# Patient Record
Sex: FEMALE | Race: WHITE | Employment: OTHER | URBAN - NONMETROPOLITAN AREA
[De-identification: names, ages, dates, MRNs, and addresses within clinical notes are randomized per-mention and may not be internally consistent; named-entity substitution may affect disease eponyms.]

---

## 2021-11-08 ENCOUNTER — ANESTHESIA EVENT (OUTPATIENT)
Dept: OPERATING ROOM | Age: 66
End: 2021-11-08
Payer: MEDICARE

## 2021-11-08 ENCOUNTER — HOSPITAL ENCOUNTER (OUTPATIENT)
Age: 66
Discharge: HOME | End: 2021-11-08
Payer: MEDICARE

## 2021-11-08 ENCOUNTER — OFFICE VISIT (OUTPATIENT)
Dept: UROLOGY | Age: 66
End: 2021-11-08
Payer: MEDICARE

## 2021-11-08 VITALS
HEIGHT: 61 IN | DIASTOLIC BLOOD PRESSURE: 81 MMHG | SYSTOLIC BLOOD PRESSURE: 121 MMHG | BODY MASS INDEX: 34.93 KG/M2 | HEART RATE: 72 BPM | WEIGHT: 185 LBS

## 2021-11-08 DIAGNOSIS — N20.1 URETERAL CALCULUS: Primary | ICD-10-CM

## 2021-11-08 DIAGNOSIS — N20.1 URETERAL CALCULUS: ICD-10-CM

## 2021-11-08 PROCEDURE — G8484 FLU IMMUNIZE NO ADMIN: HCPCS | Performed by: UROLOGY

## 2021-11-08 PROCEDURE — 3017F COLORECTAL CA SCREEN DOC REV: CPT | Performed by: UROLOGY

## 2021-11-08 PROCEDURE — 1123F ACP DISCUSS/DSCN MKR DOCD: CPT | Performed by: UROLOGY

## 2021-11-08 PROCEDURE — G8400 PT W/DXA NO RESULTS DOC: HCPCS | Performed by: UROLOGY

## 2021-11-08 PROCEDURE — 99211 OFF/OP EST MAY X REQ PHY/QHP: CPT

## 2021-11-08 PROCEDURE — G8417 CALC BMI ABV UP PARAM F/U: HCPCS | Performed by: UROLOGY

## 2021-11-08 PROCEDURE — 4040F PNEUMOC VAC/ADMIN/RCVD: CPT | Performed by: UROLOGY

## 2021-11-08 PROCEDURE — 1090F PRES/ABSN URINE INCON ASSESS: CPT | Performed by: UROLOGY

## 2021-11-08 PROCEDURE — 99204 OFFICE O/P NEW MOD 45 MIN: CPT | Performed by: UROLOGY

## 2021-11-08 PROCEDURE — G8427 DOCREV CUR MEDS BY ELIG CLIN: HCPCS | Performed by: UROLOGY

## 2021-11-08 PROCEDURE — 1036F TOBACCO NON-USER: CPT | Performed by: UROLOGY

## 2021-11-08 RX ORDER — PANTOPRAZOLE SODIUM 20 MG/1
20 TABLET, DELAYED RELEASE ORAL DAILY
COMMUNITY

## 2021-11-08 RX ORDER — HYDROCODONE BITARTRATE AND ACETAMINOPHEN 5; 325 MG/1; MG/1
TABLET ORAL
COMMUNITY
Start: 2021-11-04 | End: 2021-11-15

## 2021-11-08 RX ORDER — ONDANSETRON 4 MG/1
TABLET, ORALLY DISINTEGRATING ORAL
COMMUNITY
Start: 2021-11-04

## 2021-11-08 RX ORDER — SERTRALINE HYDROCHLORIDE 100 MG/1
TABLET, FILM COATED ORAL
COMMUNITY
Start: 2021-08-23

## 2021-11-08 RX ORDER — AMLODIPINE AND VALSARTAN 5; 160 MG/1; MG/1
TABLET ORAL
COMMUNITY
Start: 2021-08-23

## 2021-11-08 RX ORDER — CETIRIZINE HYDROCHLORIDE 10 MG/1
10 TABLET ORAL DAILY
COMMUNITY

## 2021-11-08 RX ORDER — KETOROLAC TROMETHAMINE 10 MG/1
TABLET, FILM COATED ORAL
COMMUNITY
Start: 2021-11-04

## 2021-11-08 RX ORDER — TAMSULOSIN HYDROCHLORIDE 0.4 MG/1
CAPSULE ORAL
COMMUNITY
Start: 2021-11-04

## 2021-11-08 ASSESSMENT — ENCOUNTER SYMPTOMS
NAUSEA: 0
VOMITING: 0
SHORTNESS OF BREATH: 0
COLOR CHANGE: 0
EYE REDNESS: 0
WHEEZING: 0
BACK PAIN: 0
ABDOMINAL PAIN: 0
COUGH: 0
EYE PAIN: 0

## 2021-11-08 NOTE — H&P
History and Physical    Patient:  Divina Jensen  MRN: [id number]    CHIEF COMPLAINT:  Right flank pain    HISTORY OF PRESENT ILLNESS:   The patient is a 74 y.o. female who presents with right flank pain. Patient being seen here today as a new patient. Patient was referred by outside emergency department. Patient did recently presented with right-sided flank pain. She did have a recent CT scan. This film was independently reviewed today. This does show a 7 mm stone in the proximal ureter on the right. Associated with hydroureteronephrosis. Patient does state that she has never had a kidney stone before. She does state that she was told that she had stones in her bladder at one point time on the CT scan. Patient's pain is still requiring oral pain medication. Patient has no gross hematuria dysuria currently. Patient did have urinalysis drawn the outside hospital that was negative. Pain is moderate today. This is in the right flank radiating to the groin. Patient has never seen urology in the past.    Past Medical History:    Past Medical History:   Diagnosis Date    Cerebral artery occlusion     rt. middle artery -- per patient    Cerebral artery occlusion with cerebral infarction ([facility] [address]     several mini strokes; last one 2015    GERD (gastroesophageal reflux disease)     Hypertension     PONV (postoperative nausea and vomiting)        Past Surgical History:    Past Surgical History:   Procedure Laterality Date    APPENDECTOMY      BREAST BIOPSY Right     CARPAL TUNNEL RELEASE Bilateral     COLONOSCOPY  2019    FINGER TRIGGER RELEASE Right     index finger    TOE SURGERY Left     4th toe    TONSILLECTOMY      TUBAL LIGATION         Medications Prior to Admission:    Prior to Admission medications    Medication Sig Start Date End Date Taking?  Authorizing Provider   HYDROcodone-acetaminophen (NORCO) 5-325 MG per tablet take 1 to 2 tablets by mouth six times a day if needed 11/4/21   Historical Provider, MD   ketorolac (TORADOL) 10 MG tablet take 1 tablet by mouth three times a day if needed for pain  Patient not taking: Reported on 11/8/2021 11/4/21   Historical Provider, MD   ondansetron (ZOFRAN-ODT) 4 MG disintegrating tablet dissolve 1 tablet ON TONGUE every 6 hours if needed for nausea and vomiting 11/4/21   Historical Provider, MD   tamsulosin (FLOMAX) 0.4 MG capsule take 1 capsule by mouth once daily  Patient not taking: Reported on 11/8/2021 11/4/21   Historical Provider, MD   sertraline (ZOLOFT) 100 MG tablet  8/23/21   Historical Provider, MD   amLODIPine-valsartan (Cynthia Green) 5-160 MG per tablet  8/23/21   Historical Provider, MD   cetirizine (ZYRTEC) 10 MG tablet Take 10 mg by mouth daily    Historical Provider, MD   pantoprazole (PROTONIX) 20 MG tablet Take 20 mg by mouth daily    Historical Provider, MD   Lactobacillus-Inulin (CULTURELLE DIGESTIVE DAILY PO) Take by mouth    Historical Provider, MD   sertraline (ZOLOFT) 50 MG tablet Take 50 mg by mouth daily    Historical Provider, MD       Allergies:  Macrodantin [nitrofurantoin] and Sulfa antibiotics    Social History:    Social History     Socioeconomic History    Marital status:      Spouse name: Not on file    Number of children: Not on file    Years of education: Not on file    Highest education level: Not on file   Occupational History    Not on file   Tobacco Use    Smoking status: Former Smoker     Quit date: 7/12/2018     Years since quitting: 3.3    Smokeless tobacco: Never Used   Substance and Sexual Activity    Alcohol use: Not on file    Drug use: Never    Sexual activity: Not on file   Other Topics Concern    Not on file   Social History Narrative    Not on file     Social Determinants of Health     Financial Resource Strain:     Difficulty of Paying Living Expenses: Not on file   Food Insecurity:     Worried About [address] in the Last Year: Not on file    [address] in the Last Year: Not on file   Transportation Needs:     Lack of Transportation (Medical): Not on file    Lack of Transportation (Non-Medical): Not on file   Physical Activity:     Days of Exercise per Week: Not on file    Minutes of Exercise per Session: Not on file   Stress:     Feeling of Stress : Not on file   Social Connections:     Frequency of Communication with Friends and Family: Not on file    Frequency of Social Gatherings with Friends and Family: Not on file    Attends Shinto Services: Not on file    Active Member of [address] [place] [address] [employer] or Organizations: Not on file    Attends Club or Organization Meetings: Not on file    Marital Status: Not on file   Intimate Partner Violence:     Fear of Current or Ex-Partner: Not on file    Emotionally Abused: Not on file    Physically Abused: Not on file    Sexually Abused: Not on file   Housing Stability:     Unable to Pay for Housing in the Last Year: Not on file    Number of [place] in the Last Year: Not on file    Unstable Housing in the Last Year: Not on file       Family History:  No family history on file. REVIEW OF SYSTEMS:  All systems reviewed and negative except for that already noted in the HPI. Physical Exam:      This a 74 y.o. female   Patient Vitals for the past 24 hrs:   Height Weight   11/08/21 1422 5' 1\" (1.549 m) 185 lb (83.9 kg)     Constitutional: Patient in no acute distress. Neuro: Alert and oriented to person, place and time. Psych: mood and affect normal  HEENT negative  Lungs: Respiratory effort is normal  Cardiovascular: Normal peripheral pulses  Abdomen: Soft, non-tender, non-distended with no CVA, flank pain or hepatosplenomegaly. No hernias. Kidneys normal.  Lymphatics: No palpable lymphadenopathy. Bladder non-tender and not distended.   Pelvic exam:  External genitalia normal  Urethral and urethral meatus normal  Vagina normal with no evidence of pelvic prolapse  Uterus normal  Adnexa normal  Anus and perineum normal  Rectal exam not indicated    LABS:   No results for input(s): WBC, HGB, HCT, MCV, PLT in the last 72 hours. No results for input(s): NA, K, CL, CO2, PHOS, BUN, CREATININE in the last 72 hours. Invalid input(s): CA    Additional Lab/culture results:    Urinalysis: No results for input(s): COLORU, PHUR, LABCAST, WBCUA, RBCUA, MUCUS, TRICHOMONAS, YEAST, BACTERIA, CLARITYU, SPECGRAV, LEUKOCYTESUR, UROBILINOGEN, Darnella Montane in the last 72 hours.     Invalid input(s): NITRATE, GLUCOSEUKETONESUAMORPHOUS     -----------------------------------------------------------------  Imaging Results:      Assessment and Plan   Impression:    Patient Active Problem List   Diagnosis    Ureteral calculus       Plan: Right Pravin Galeano MD  5:11 PM 11/8/2021

## 2021-11-08 NOTE — PROGRESS NOTES
HPI:        Patient is a 74 y.o. female in no acute distress. She is alert and oriented to person, place, and time. Patient being seen here today as a new patient. Patient was referred by outside emergency department. Patient did recently presented with right-sided flank pain. She did have a recent CT scan. This film was independently reviewed today. This does show a 7 mm stone in the proximal ureter on the right. Associated with hydroureteronephrosis. Patient does state that she has never had a kidney stone before. She does state that she was told that she had stones in her bladder at one point time on the CT scan. Patient's pain is still requiring oral pain medication. Patient has no gross hematuria dysuria currently. Patient did have urinalysis drawn the outside hospital that was negative. Pain is moderate today. This is in the right flank radiating to the groin. Patient has never seen urology in the past.    No past medical history on file. No past surgical history on file.   Outpatient Encounter Medications as of 11/8/2021   Medication Sig Dispense Refill    HYDROcodone-acetaminophen (NORCO) 5-325 MG per tablet take 1 to 2 tablets by mouth six times a day if needed      ondansetron (ZOFRAN-ODT) 4 MG disintegrating tablet dissolve 1 tablet ON TONGUE every 6 hours if needed for nausea and vomiting      sertraline (ZOLOFT) 100 MG tablet       amLODIPine-valsartan (EXFORGE) 5-160 MG per tablet       cetirizine (ZYRTEC) 10 MG tablet Take 10 mg by mouth daily      pantoprazole (PROTONIX) 20 MG tablet Take 20 mg by mouth daily      Lactobacillus-Inulin (CULTURELLE DIGESTIVE DAILY PO) Take by mouth      sertraline (ZOLOFT) 50 MG tablet Take 50 mg by mouth daily      ketorolac (TORADOL) 10 MG tablet take 1 tablet by mouth three times a day if needed for pain (Patient not taking: Reported on 11/8/2021)      tamsulosin (FLOMAX) 0.4 MG capsule take 1 capsule by mouth once daily (Patient discharge. Musculoskeletal: Negative for back pain, joint swelling and myalgias. Skin: Negative for color change, rash and wound. Neurological: Negative for dizziness, tremors and numbness. Hematological: Negative for adenopathy. Does not bruise/bleed easily. BP (!) 140/87 (Site: Left Upper Arm, Position: Sitting, Cuff Size: Medium Adult)   Pulse 71   Ht 5' 1\" (1.549 m)   Wt 185 lb (83.9 kg)   BMI 34.96 kg/m²       PHYSICAL EXAM:  Constitutional: Patient resting comfortably, in no acute distress. Neuro: Alert and oriented to person place and time. Cranial nerves grossly intact. Psych: Mood and affect normal.  Skin: Warm, dry  HEENT: normocephalic, atraumatic  Lymphatics: No palpable lymphadenopathy  Lungs: Respiratory effort normal, unlabored  Cardiovascular:  Normal peripheral pulses  Abdomen: Soft, non-tender, non-distended with no organomegaly or palpable masses. : No CVA tenderness. Bladder non-tender and not distended. Pelvic: deferred    No results found for: BUN  No results found for: CREATININE    ASSESSMENT:  This is a 74 y.o. female with the following diagnoses:   Diagnosis Orders   1. Ureteral calculus  EKG 12 Lead         PLAN:  We will plan for right-sided holmium laser lithotripsy tomorrow. We will need her to get an EKG today. She does understand the risks and benefits of the procedure. She would like to proceed. We did offer her a trial of medical expulsive therapy. This point time she wishes to go ahead with definitive therapy.

## 2021-11-09 ENCOUNTER — APPOINTMENT (OUTPATIENT)
Dept: GENERAL RADIOLOGY | Age: 66
End: 2021-11-09
Attending: UROLOGY
Payer: MEDICARE

## 2021-11-09 ENCOUNTER — ANESTHESIA (OUTPATIENT)
Dept: OPERATING ROOM | Age: 66
End: 2021-11-09
Payer: MEDICARE

## 2021-11-09 ENCOUNTER — HOSPITAL ENCOUNTER (OUTPATIENT)
Age: 66
Setting detail: OUTPATIENT SURGERY
Discharge: HOME | End: 2021-11-09
Attending: UROLOGY | Admitting: UROLOGY
Payer: MEDICARE

## 2021-11-09 VITALS
RESPIRATION RATE: 16 BRPM | TEMPERATURE: 97.1 F | HEIGHT: 61 IN | BODY MASS INDEX: 34.44 KG/M2 | DIASTOLIC BLOOD PRESSURE: 90 MMHG | SYSTOLIC BLOOD PRESSURE: 149 MMHG | OXYGEN SATURATION: 92 % | HEART RATE: 70 BPM | WEIGHT: 182.4 LBS

## 2021-11-09 VITALS — DIASTOLIC BLOOD PRESSURE: 107 MMHG | SYSTOLIC BLOOD PRESSURE: 188 MMHG | OXYGEN SATURATION: 91 %

## 2021-11-09 LAB
EKG ATRIAL RATE: 71 BPM
EKG P AXIS: 61 DEGREES
EKG P-R INTERVAL: 150 MS
EKG Q-T INTERVAL: 356 MS
EKG QRS DURATION: 82 MS
EKG QTC CALCULATION (BAZETT): 386 MS
EKG R AXIS: 22 DEGREES
EKG T AXIS: 29 DEGREES
EKG VENTRICULAR RATE: 71 BPM
SARS-COV-2, RAPID: NOT DETECTED
SPECIMEN DESCRIPTION: NORMAL

## 2021-11-09 PROCEDURE — C2617 STENT, NON-COR, TEM W/O DEL: HCPCS | Performed by: UROLOGY

## 2021-11-09 PROCEDURE — 2500000003 HC RX 250 WO HCPCS: Performed by: NURSE ANESTHETIST, CERTIFIED REGISTERED

## 2021-11-09 PROCEDURE — 3700000000 HC ANESTHESIA ATTENDED CARE: Performed by: UROLOGY

## 2021-11-09 PROCEDURE — 6360000002 HC RX W HCPCS: Performed by: NURSE ANESTHETIST, CERTIFIED REGISTERED

## 2021-11-09 PROCEDURE — 3700000001 HC ADD 15 MINUTES (ANESTHESIA): Performed by: UROLOGY

## 2021-11-09 PROCEDURE — 7100000010 HC PHASE II RECOVERY - FIRST 15 MIN: Performed by: UROLOGY

## 2021-11-09 PROCEDURE — 2580000003 HC RX 258: Performed by: UROLOGY

## 2021-11-09 PROCEDURE — 2580000003 HC RX 258: Performed by: NURSE ANESTHETIST, CERTIFIED REGISTERED

## 2021-11-09 PROCEDURE — C9803 HOPD COVID-19 SPEC COLLECT: HCPCS

## 2021-11-09 PROCEDURE — 3209999900 FLUORO FOR SURGICAL PROCEDURES

## 2021-11-09 PROCEDURE — C1758 CATHETER, URETERAL: HCPCS | Performed by: UROLOGY

## 2021-11-09 PROCEDURE — 2720000010 HC SURG SUPPLY STERILE: Performed by: UROLOGY

## 2021-11-09 PROCEDURE — 3600000014 HC SURGERY LEVEL 4 ADDTL 15MIN: Performed by: UROLOGY

## 2021-11-09 PROCEDURE — 2709999900 HC NON-CHARGEABLE SUPPLY: Performed by: UROLOGY

## 2021-11-09 PROCEDURE — 6370000000 HC RX 637 (ALT 250 FOR IP): Performed by: NURSE ANESTHETIST, CERTIFIED REGISTERED

## 2021-11-09 PROCEDURE — 6360000002 HC RX W HCPCS: Performed by: UROLOGY

## 2021-11-09 PROCEDURE — 7100000011 HC PHASE II RECOVERY - ADDTL 15 MIN: Performed by: UROLOGY

## 2021-11-09 PROCEDURE — 87635 SARS-COV-2 COVID-19 AMP PRB: CPT

## 2021-11-09 PROCEDURE — 3600000004 HC SURGERY LEVEL 4 BASE: Performed by: UROLOGY

## 2021-11-09 PROCEDURE — 6370000000 HC RX 637 (ALT 250 FOR IP): Performed by: UROLOGY

## 2021-11-09 PROCEDURE — C1769 GUIDE WIRE: HCPCS | Performed by: UROLOGY

## 2021-11-09 DEVICE — URETERAL STENT
Type: IMPLANTABLE DEVICE | Status: FUNCTIONAL
Brand: PERCUFLEX™ PLUS

## 2021-11-09 RX ORDER — ONDANSETRON 2 MG/ML
4 INJECTION INTRAMUSCULAR; INTRAVENOUS
Status: DISCONTINUED | OUTPATIENT
Start: 2021-11-09 | End: 2021-11-09 | Stop reason: HOSPADM

## 2021-11-09 RX ORDER — HYDROCODONE BITARTRATE AND ACETAMINOPHEN 5; 325 MG/1; MG/1
1 TABLET ORAL
Status: COMPLETED | OUTPATIENT
Start: 2021-11-09 | End: 2021-11-09

## 2021-11-09 RX ORDER — DIMENHYDRINATE 50 MG/1
50 TABLET ORAL ONCE
Status: COMPLETED | OUTPATIENT
Start: 2021-11-09 | End: 2021-11-09

## 2021-11-09 RX ORDER — PROPOFOL 10 MG/ML
INJECTION, EMULSION INTRAVENOUS PRN
Status: DISCONTINUED | OUTPATIENT
Start: 2021-11-09 | End: 2021-11-09 | Stop reason: SDUPTHER

## 2021-11-09 RX ORDER — LIDOCAINE HYDROCHLORIDE 20 MG/ML
JELLY TOPICAL PRN
Status: DISCONTINUED | OUTPATIENT
Start: 2021-11-09 | End: 2021-11-09 | Stop reason: ALTCHOICE

## 2021-11-09 RX ORDER — FENTANYL CITRATE 50 UG/ML
INJECTION, SOLUTION INTRAMUSCULAR; INTRAVENOUS PRN
Status: DISCONTINUED | OUTPATIENT
Start: 2021-11-09 | End: 2021-11-09 | Stop reason: SDUPTHER

## 2021-11-09 RX ORDER — LABETALOL HYDROCHLORIDE 5 MG/ML
5 INJECTION, SOLUTION INTRAVENOUS EVERY 10 MIN PRN
Status: DISCONTINUED | OUTPATIENT
Start: 2021-11-09 | End: 2021-11-09 | Stop reason: HOSPADM

## 2021-11-09 RX ORDER — DEXAMETHASONE SODIUM PHOSPHATE 4 MG/ML
INJECTION, SOLUTION INTRA-ARTICULAR; INTRALESIONAL; INTRAMUSCULAR; INTRAVENOUS; SOFT TISSUE PRN
Status: DISCONTINUED | OUTPATIENT
Start: 2021-11-09 | End: 2021-11-09 | Stop reason: SDUPTHER

## 2021-11-09 RX ORDER — LIDOCAINE HYDROCHLORIDE 20 MG/ML
INJECTION, SOLUTION EPIDURAL; INFILTRATION; INTRACAUDAL; PERINEURAL PRN
Status: DISCONTINUED | OUTPATIENT
Start: 2021-11-09 | End: 2021-11-09 | Stop reason: SDUPTHER

## 2021-11-09 RX ORDER — SODIUM CHLORIDE, SODIUM LACTATE, POTASSIUM CHLORIDE, CALCIUM CHLORIDE 600; 310; 30; 20 MG/100ML; MG/100ML; MG/100ML; MG/100ML
INJECTION, SOLUTION INTRAVENOUS CONTINUOUS
Status: DISCONTINUED | OUTPATIENT
Start: 2021-11-09 | End: 2021-11-09 | Stop reason: HOSPADM

## 2021-11-09 RX ORDER — CIPROFLOXACIN 2 MG/ML
400 INJECTION, SOLUTION INTRAVENOUS
Status: COMPLETED | OUTPATIENT
Start: 2021-11-09 | End: 2021-11-09

## 2021-11-09 RX ORDER — MAGNESIUM HYDROXIDE 1200 MG/15ML
LIQUID ORAL PRN
Status: DISCONTINUED | OUTPATIENT
Start: 2021-11-09 | End: 2021-11-09 | Stop reason: ALTCHOICE

## 2021-11-09 RX ORDER — ACETAMINOPHEN 325 MG/1
650 TABLET ORAL ONCE
Status: COMPLETED | OUTPATIENT
Start: 2021-11-09 | End: 2021-11-09

## 2021-11-09 RX ORDER — ONDANSETRON 2 MG/ML
INJECTION INTRAMUSCULAR; INTRAVENOUS PRN
Status: DISCONTINUED | OUTPATIENT
Start: 2021-11-09 | End: 2021-11-09 | Stop reason: SDUPTHER

## 2021-11-09 RX ORDER — KETOROLAC TROMETHAMINE 30 MG/ML
INJECTION, SOLUTION INTRAMUSCULAR; INTRAVENOUS PRN
Status: DISCONTINUED | OUTPATIENT
Start: 2021-11-09 | End: 2021-11-09 | Stop reason: SDUPTHER

## 2021-11-09 RX ORDER — MAGNESIUM HYDROXIDE 1200 MG/15ML
LIQUID ORAL CONTINUOUS PRN
Status: COMPLETED | OUTPATIENT
Start: 2021-11-09 | End: 2021-11-09

## 2021-11-09 RX ORDER — HYDRALAZINE HYDROCHLORIDE 20 MG/ML
5 INJECTION INTRAMUSCULAR; INTRAVENOUS EVERY 10 MIN PRN
Status: DISCONTINUED | OUTPATIENT
Start: 2021-11-09 | End: 2021-11-09 | Stop reason: HOSPADM

## 2021-11-09 RX ADMIN — FENTANYL CITRATE 25 MCG: 50 INJECTION INTRAMUSCULAR; INTRAVENOUS at 12:58

## 2021-11-09 RX ADMIN — HYDRALAZINE HYDROCHLORIDE 5 MG: 20 INJECTION INTRAMUSCULAR; INTRAVENOUS at 13:44

## 2021-11-09 RX ADMIN — LABETALOL HYDROCHLORIDE 5 MG: 5 INJECTION INTRAVENOUS at 14:51

## 2021-11-09 RX ADMIN — KETOROLAC TROMETHAMINE 30 MG: 30 INJECTION, SOLUTION INTRAMUSCULAR; INTRAVENOUS at 13:17

## 2021-11-09 RX ADMIN — LABETALOL HYDROCHLORIDE 5 MG: 5 INJECTION INTRAVENOUS at 14:09

## 2021-11-09 RX ADMIN — HYDROCODONE BITARTRATE AND ACETAMINOPHEN 1 TABLET: 5; 325 TABLET ORAL at 15:01

## 2021-11-09 RX ADMIN — FAMOTIDINE 20 MG: 10 INJECTION, SOLUTION INTRAVENOUS at 12:34

## 2021-11-09 RX ADMIN — LIDOCAINE HYDROCHLORIDE 60 MG: 20 INJECTION, SOLUTION EPIDURAL; INFILTRATION; INTRACAUDAL; PERINEURAL at 12:37

## 2021-11-09 RX ADMIN — DIMENHYDRINATE 50 MG: 50 TABLET ORAL at 10:56

## 2021-11-09 RX ADMIN — ACETAMINOPHEN 650 MG: 325 TABLET ORAL at 10:56

## 2021-11-09 RX ADMIN — CIPROFLOXACIN 400 MG: 2 INJECTION, SOLUTION INTRAVENOUS at 12:27

## 2021-11-09 RX ADMIN — DEXAMETHASONE SODIUM PHOSPHATE 4 MG: 4 INJECTION, SOLUTION INTRAMUSCULAR; INTRAVENOUS at 12:43

## 2021-11-09 RX ADMIN — PHENYLEPHRINE HYDROCHLORIDE 100 MCG: 10 INJECTION INTRAVENOUS at 12:52

## 2021-11-09 RX ADMIN — PROPOFOL 130 MG: 10 INJECTION, EMULSION INTRAVENOUS at 12:37

## 2021-11-09 RX ADMIN — FENTANYL CITRATE 25 MCG: 50 INJECTION INTRAMUSCULAR; INTRAVENOUS at 13:22

## 2021-11-09 RX ADMIN — HYDROCODONE BITARTRATE AND ACETAMINOPHEN 1 TABLET: 5; 325 TABLET ORAL at 14:07

## 2021-11-09 RX ADMIN — ONDANSETRON 4 MG: 2 INJECTION INTRAMUSCULAR; INTRAVENOUS at 12:43

## 2021-11-09 RX ADMIN — SODIUM CHLORIDE, POTASSIUM CHLORIDE, SODIUM LACTATE AND CALCIUM CHLORIDE: 600; 310; 30; 20 INJECTION, SOLUTION INTRAVENOUS at 10:55

## 2021-11-09 RX ADMIN — FENTANYL CITRATE 50 MCG: 50 INJECTION INTRAMUSCULAR; INTRAVENOUS at 12:35

## 2021-11-09 ASSESSMENT — PAIN DESCRIPTION - DESCRIPTORS
DESCRIPTORS: CRAMPING

## 2021-11-09 ASSESSMENT — PAIN SCALES - GENERAL
PAINLEVEL_OUTOF10: 3
PAINLEVEL_OUTOF10: 4
PAINLEVEL_OUTOF10: 3
PAINLEVEL_OUTOF10: 1
PAINLEVEL_OUTOF10: 4
PAINLEVEL_OUTOF10: 0
PAINLEVEL_OUTOF10: 3
PAINLEVEL_OUTOF10: 4
PAINLEVEL_OUTOF10: 3
PAINLEVEL_OUTOF10: 0

## 2021-11-09 ASSESSMENT — PAIN DESCRIPTION - LOCATION
LOCATION: ABDOMEN

## 2021-11-09 ASSESSMENT — PAIN DESCRIPTION - PAIN TYPE
TYPE: SURGICAL PAIN

## 2021-11-09 ASSESSMENT — PAIN DESCRIPTION - FREQUENCY: FREQUENCY: INTERMITTENT

## 2021-11-09 ASSESSMENT — PAIN DESCRIPTION - ORIENTATION
ORIENTATION: LOWER

## 2021-11-09 ASSESSMENT — PAIN DESCRIPTION - PROGRESSION
CLINICAL_PROGRESSION: GRADUALLY WORSENING
CLINICAL_PROGRESSION: NOT CHANGED

## 2021-11-09 ASSESSMENT — PAIN - FUNCTIONAL ASSESSMENT: PAIN_FUNCTIONAL_ASSESSMENT: 0-10

## 2021-11-09 ASSESSMENT — LIFESTYLE VARIABLES: SMOKING_STATUS: 0

## 2021-11-09 NOTE — PROGRESS NOTES
Pt. Ambulates to bathroom with steady gait. Voids clear cherry urine. States her pain is worse than when she arrived for her procedure today, though pain is improved after voiding. States she feels like she needs additional pain medicine.

## 2021-11-09 NOTE — ANESTHESIA POSTPROCEDURE EVALUATION
Department of Anesthesiology  Postprocedure Note    Patient: Tramaine Aguayo  MRN: [id number]  YOB: 1955  Date of evaluation: 11/9/2021  Time:  5:08 PM     Procedure Summary     Date: 11/09/21 Room / Location: [address] [place] [address] / [facility]    Anesthesia Start: 7867 Anesthesia Stop: 4500    Procedures:       CYSTOSCOPY URETEROSCOPY LASER - HLL (Right )      CYSTOSCOPY URETERAL STENT INSERTION (Right ) Diagnosis: (RIGHT URETERAL CALCULUS)    Surgeons: Winston Apodaca MD Responsible Provider: GOSIA Amin CRNA    Anesthesia Type: general ASA Status: 3          Anesthesia Type: general    Gayle Phase I:      Gayle Phase II: Gayle Score: 10    Last vitals: Reviewed and per EMR flowsheets.        Anesthesia Post Evaluation    Patient location during evaluation: PACU  Patient participation: complete - patient participated  Level of consciousness: awake and alert  Airway patency: patent  Nausea & Vomiting: no nausea and no vomiting  Complications: no  Cardiovascular status: blood pressure returned to baseline and hemodynamically stable  Respiratory status: acceptable and room air  Hydration status: euvolemic

## 2021-11-09 NOTE — PROGRESS NOTES
Pt. C/o feeling like she needs to urinate. Ambulates to bathroom with steady gait, assisted by this nurse. Voids x3, clear pink to cherry urine. Returns to bay 4, assisted into recliner chair and covered with warmed blankets. Patient states she continues to feel urge to urinate. Infomed patient this sensation is common with a urinary stent in place. Patient asks about her blood pressure. Explained that blood pressure has been high in recovery, and is being treated with I.v. medication. Verbalizes understanding.

## 2021-11-09 NOTE — ANESTHESIA PRE PROCEDURE
OR Ladarius Edgar MD           Allergies:     Allergies   Allergen Reactions    Macrodantin [Nitrofurantoin] Hives and Itching    Sulfa Antibiotics Hives and Itching       Problem List:    Patient Active Problem List   Diagnosis Code    Ureteral calculus N20.1       Past Medical History:        Diagnosis Date    Cerebral artery occlusion     rt. middle artery -- per patient    Cerebral artery occlusion with cerebral infarction ([facility] [address]     several mini strokes; last one 2015    GERD (gastroesophageal reflux disease)     Hypertension     PONV (postoperative nausea and vomiting)        Past Surgical History:        Procedure Laterality Date    APPENDECTOMY      BREAST BIOPSY Right     CARPAL TUNNEL RELEASE Bilateral     COLONOSCOPY  2019    FINGER TRIGGER RELEASE Right     index finger    HEMORRHOID SURGERY      2018    TOE SURGERY Left     4th toe    TONSILLECTOMY      TUBAL LIGATION         Social History:    Social History     Tobacco Use    Smoking status: Former Smoker     Quit date: 7/12/2018     Years since quitting: 3.3    Smokeless tobacco: Never Used   Substance Use Topics    Alcohol use: Not Currently                                Counseling given: Not Answered      Vital Signs (Current):   Vitals:    11/09/21 1033 11/09/21 1051 11/09/21 1059 11/09/21 1120   BP:  (!) 176/106 (!) 181/81 (!) 156/91   Pulse:  73     Resp:  20     Temp:  37.2 °C (98.9 °F)     TempSrc:  Temporal     SpO2:  95%     Weight: 182 lb 6.4 oz (82.7 kg)      Height:  5' 1\" (1.549 m)                                                BP Readings from Last 3 Encounters:   11/09/21 (!) 156/91   11/08/21 121/81       NPO Status: Time of last liquid consumption: 2345                        Time of last solid consumption: 1900                        Date of last liquid consumption: 11/08/21                        Date of last solid food consumption: 11/08/21    BMI:   Wt Readings from Last 3 Encounters:   11/09/21 182 lb 6.4 oz (82.7 kg)   11/08/21 185 lb (83.9 kg)     Body mass index is 34.46 kg/m². CBC: No results found for: WBC, RBC, HGB, HCT, MCV, RDW, PLT    CMP: No results found for: NA, K, CL, CO2, BUN, CREATININE, GFRAA, AGRATIO, LABGLOM, GLUCOSE, PROT, CALCIUM, BILITOT, ALKPHOS, AST, ALT    POC Tests: No results for input(s): POCGLU, POCNA, POCK, POCCL, POCBUN, POCHEMO, POCHCT in the last 72 hours. Coags: No results found for: PROTIME, INR, APTT    HCG (If Applicable): No results found for: PREGTESTUR, PREGSERUM, HCG, HCGQUANT     ABGs: No results found for: PHART, PO2ART, LRZ3DWG, JIS6DII, BEART, I0GNBXZS     Type & Screen (If Applicable):  No results found for: LABABO, LABRH    Drug/Infectious Status (If Applicable):  No results found for: HIV, HEPCAB    COVID-19 Screening (If Applicable):   Lab Results   Component Value Date    COVID19 Not Detected 11/09/2021           Anesthesia Evaluation  Patient summary reviewed and Nursing notes reviewed   history of anesthetic complications (patient reports with pain medication): PONV. Airway: Mallampati: II  TM distance: >3 FB   Neck ROM: full  Mouth opening: > = 3 FB Dental: normal exam         Pulmonary:Negative Pulmonary ROS and normal exam        (-) not a current smoker                           Cardiovascular:  Exercise tolerance: good (>4 METS),   (+) hypertension:,       ECG reviewed                        Neuro/Psych:   (+) CVA (reports had weakness in left arm but has improved): no interval change, depression/anxiety             GI/Hepatic/Renal:   (+) GERD: well controlled, renal disease: kidney stones,           Endo/Other: Negative Endo/Other ROS                    Abdominal:   (+) obese,           Vascular: negative vascular ROS. ROS comment: Middle Cerebral artery occlusion- reports stopped going to neurologist,  reports last mini stroke was 6 years. Other Findings:             Anesthesia Plan      general     ASA 3       Induction: intravenous.     MIPS:

## 2021-11-09 NOTE — PROGRESS NOTES

## 2021-11-10 ENCOUNTER — TELEPHONE (OUTPATIENT)
Dept: UROLOGY | Age: 66
End: 2021-11-10

## 2021-11-10 NOTE — OP NOTE
[facility]                                OPERATIVE REPORT    PATIENT NAME: Gale Pan                      :        1955  MED REC NO:   [id number]                              ROOM:  ACCOUNT NO:   [de-identified]                           ADMIT DATE: 2021  PROVIDER:     Abena Reyna    DATE OF PROCEDURE:  2021    SURGEON:  Dr. Abena Reyna. ASSISTANT:  None. PREOPERATIVE DIAGNOSES:  1. Right ureteral calculus. 2.  Right renal calculus. POSTOPERATIVE DIAGNOSIS:  Right renal calculus. PROCEDURE PERFORMED:  Cystoscopy, right ureteroscopy, right holmium  laser lithotripsy, right ureteral stent placement. ANESTHESIA:  General.    COMPLICATIONS:  None. ESTIMATED BLOOD LOSS:  Minimal.    SPECIMENS:  None. PROSTHESIS:  A 6-Argentine 24-cm double-J ureteral stent. DISPOSITION:  Stable. FINDINGS:  A 7 or 8 mm stone in the lower pole of the right kidney. INDICATIONS:  The patient is a 26-year-old female with extensive stone  history. She did have a recent CT scan showing stone in the ureter as  well as in the kidney, here now for definitive therapy. DESCRIPTION OF PROCEDURE:  The patient was taken back to the operating  room after informed consent including all risks, benefits, and  alternatives were obtained. The patient was transferred from the [facility]  onto the operating table, where she was induced under general  anesthesia, given IV Cipro for preoperative antibiotic prophylaxis. To  begin the case, she was prepped and draped in normal sterile fashion. She was placed in dorsal lithotomy. She had a 22-Argentine sheath with a  30-degree lens passed through the urethra into the bladder. Once in the  bladder, we identified the right ureteral orifice. A 0.035-inch wire  was passed up.   Dual lumen catheter was placed up and additional  Glidewire was passed up the ureter into the kidney, confirmed via  fluoroscopy. We then took a flexible ureteroscope up. We did not  identify any stones in the ureter. We went into the kidney and  identified a large 8-mm stone in the lower pole. We were able to use  270 micron laser fiber and ablate the stone adequately to sub-2-mm  fragments. Once this was done, we removed the scope leaving the  Glidewire in place. We placed the cystoscope over the Glidewire and  placed a 6-Palestinian x 24-cm double-J ureteral stent over the Glidewire up  into the kidney. Glidewire was removed. Proximal curl was confirmed  via fluoroscopy. Distal curl was confirmed via visualization. At this  point in time, the patient was awoken from general anesthesia,  transferred to the [facility], and taken to the PACU in satisfactory  condition by the Nursing and Anesthesia teams. PLAN:  The patient will be discharged home per PACU criteria and follow  up with us for stent removal via string earlier this week.         Sophie Dobbs    D: 11/09/2021 14:31:59       T: 11/09/2021 14:51:08     [email or web address]  Job#: [id number]     Doc#: [id number]    CC:

## 2021-11-11 ENCOUNTER — OFFICE VISIT (OUTPATIENT)
Dept: UROLOGY | Age: 66
End: 2021-11-11
Payer: MEDICARE

## 2021-11-11 VITALS
HEART RATE: 98 BPM | DIASTOLIC BLOOD PRESSURE: 85 MMHG | TEMPERATURE: 98.7 F | WEIGHT: 179 LBS | SYSTOLIC BLOOD PRESSURE: 129 MMHG | BODY MASS INDEX: 33.82 KG/M2

## 2021-11-11 DIAGNOSIS — N20.1 URETERAL CALCULUS: Primary | ICD-10-CM

## 2021-11-11 PROCEDURE — G8417 CALC BMI ABV UP PARAM F/U: HCPCS | Performed by: NURSE PRACTITIONER

## 2021-11-11 PROCEDURE — 1123F ACP DISCUSS/DSCN MKR DOCD: CPT | Performed by: NURSE PRACTITIONER

## 2021-11-11 PROCEDURE — 99213 OFFICE O/P EST LOW 20 MIN: CPT | Performed by: NURSE PRACTITIONER

## 2021-11-11 PROCEDURE — 1036F TOBACCO NON-USER: CPT | Performed by: NURSE PRACTITIONER

## 2021-11-11 PROCEDURE — 1090F PRES/ABSN URINE INCON ASSESS: CPT | Performed by: NURSE PRACTITIONER

## 2021-11-11 PROCEDURE — G8484 FLU IMMUNIZE NO ADMIN: HCPCS | Performed by: NURSE PRACTITIONER

## 2021-11-11 PROCEDURE — 3017F COLORECTAL CA SCREEN DOC REV: CPT | Performed by: NURSE PRACTITIONER

## 2021-11-11 PROCEDURE — G8427 DOCREV CUR MEDS BY ELIG CLIN: HCPCS | Performed by: NURSE PRACTITIONER

## 2021-11-11 PROCEDURE — 4040F PNEUMOC VAC/ADMIN/RCVD: CPT | Performed by: NURSE PRACTITIONER

## 2021-11-11 PROCEDURE — 99211 OFF/OP EST MAY X REQ PHY/QHP: CPT

## 2021-11-11 PROCEDURE — G8400 PT W/DXA NO RESULTS DOC: HCPCS | Performed by: NURSE PRACTITIONER

## 2021-11-11 ASSESSMENT — ENCOUNTER SYMPTOMS
ABDOMINAL PAIN: 0
COLOR CHANGE: 0
NAUSEA: 0
BACK PAIN: 0
SHORTNESS OF BREATH: 0
WHEEZING: 0
COUGH: 0
VOMITING: 0
CONSTIPATION: 0
APNEA: 0
EYE REDNESS: 0

## 2021-11-11 NOTE — PROGRESS NOTES
HPI:        Patient is a 74 y.o. female in no acute distress. She is alert and oriented to person, place, and time. History  11/2021 right HLL    Today  Here today for stent removal following right holmium laser lithotripsy on 11/9/2021. She did tolerate stent removal with minimal complaints. She denies fevers, nausea or vomiting. She was evaluated in the ER yesterday due to vomiting.     Past Medical History:   Diagnosis Date    Cerebral artery occlusion     rt. middle artery -- per patient    Cerebral artery occlusion with cerebral infarction ([address]     several mini strokes; last one 2015    GERD (gastroesophageal reflux disease)     Hypertension     PONV (postoperative nausea and vomiting)      Past Surgical History:   Procedure Laterality Date    APPENDECTOMY      BREAST BIOPSY Right     CARPAL TUNNEL RELEASE Bilateral     COLONOSCOPY  2019    CYSTOSCOPY Right 11/09/2021    CYSTOSCOPY Right 11/9/2021    CYSTOSCOPY URETEROSCOPY LASER - HLL performed by Hero Tarango MD at [address] Right 11/9/2021    CYSTOSCOPY URETERAL STENT INSERTION performed by Hero Tarango MD at [address] Right     index finger    HEMORRHOID SURGERY      2018    TOE SURGERY Left     4th toe    TONSILLECTOMY      TUBAL LIGATION       Outpatient Encounter Medications as of 11/11/2021   Medication Sig Dispense Refill    HYDROcodone-acetaminophen (NORCO) 5-325 MG per tablet take 1 to 2 tablets by mouth six times a day if needed      ketorolac (TORADOL) 10 MG tablet take 1 tablet by mouth three times a day if needed for pain      ondansetron (ZOFRAN-ODT) 4 MG disintegrating tablet dissolve 1 tablet ON TONGUE every 6 hours if needed for nausea and vomiting      tamsulosin (FLOMAX) 0.4 MG capsule take 1 capsule by mouth once daily      sertraline (ZOLOFT) 100 MG tablet       amLODIPine-valsartan (EXFORGE) 5-160 MG per tablet       cetirizine (ZYRTEC) 10 MG tablet Take 10 mg by mouth daily      pantoprazole (PROTONIX) 20 MG tablet Take 20 mg by mouth daily      Lactobacillus-Inulin (CULTURELLE DIGESTIVE DAILY PO) Take by mouth      sertraline (ZOLOFT) 50 MG tablet Take 50 mg by mouth daily       No facility-administered encounter medications on file as of 11/11/2021. Current Outpatient Medications on File Prior to Visit   Medication Sig Dispense Refill    HYDROcodone-acetaminophen (NORCO) 5-325 MG per tablet take 1 to 2 tablets by mouth six times a day if needed      ketorolac (TORADOL) 10 MG tablet take 1 tablet by mouth three times a day if needed for pain      ondansetron (ZOFRAN-ODT) 4 MG disintegrating tablet dissolve 1 tablet ON TONGUE every 6 hours if needed for nausea and vomiting      tamsulosin (FLOMAX) 0.4 MG capsule take 1 capsule by mouth once daily      sertraline (ZOLOFT) 100 MG tablet       amLODIPine-valsartan (EXFORGE) 5-160 MG per tablet       cetirizine (ZYRTEC) 10 MG tablet Take 10 mg by mouth daily      pantoprazole (PROTONIX) 20 MG tablet Take 20 mg by mouth daily      Lactobacillus-Inulin (CULTURELLE DIGESTIVE DAILY PO) Take by mouth      sertraline (ZOLOFT) 50 MG tablet Take 50 mg by mouth daily       No current facility-administered medications on file prior to visit. Macrodantin [nitrofurantoin] and Sulfa antibiotics  History reviewed. No pertinent family history. Social History     Tobacco Use   Smoking Status Former Smoker    Quit date: 7/12/2018    Years since quitting: 3.3   Smokeless Tobacco Never Used       Social History     Substance and Sexual Activity   Alcohol Use Not Currently       Review of Systems   Constitutional: Negative for appetite change, chills and fever. Eyes: Negative for redness and visual disturbance. Respiratory: Negative for apnea, cough, shortness of breath and wheezing. Cardiovascular: Negative for chest pain and leg swelling.    Gastrointestinal: Negative for abdominal pain, constipation, nausea and vomiting. Genitourinary: Positive for hematuria. Negative for difficulty urinating, dyspareunia, dysuria, enuresis, flank pain, frequency, pelvic pain, urgency, vaginal bleeding and vaginal discharge. Musculoskeletal: Negative for back pain, joint swelling and myalgias. Skin: Negative for color change, rash and wound. Neurological: Negative for dizziness, tremors and numbness. Hematological: Negative for adenopathy. Does not bruise/bleed easily. Psychiatric/Behavioral: Negative for sleep disturbance. /85 (Site: Right Upper Arm, Position: Sitting, Cuff Size: Large Adult)   Pulse 98   Temp 98.7 °F (37.1 °C)   Wt 179 lb (81.2 kg)   BMI 33.82 kg/m²       PHYSICAL EXAM:  Constitutional: Patient resting comfortably, in no acute distress. Neuro: Alert and oriented to person place and time. Cranial nerves grossly intact. Psych: Mood and affect normal.  Skin: Warm, dry  HEENT: normocephalic, atraumatic  Lymphatics: No palpable lymphadenopathy  Lungs: Respiratory effort normal, unlabored  Cardiovascular:  Normal peripheral pulses  Abdomen: Soft, non-tender, non-distended with no organomegaly or palpable masses. : No CVA tenderness. Bladder non-tender and not distended. Pelvic: Deferred    No results found for: BUN  No results found for: CREATININE    ASSESSMENT:   Diagnosis Orders   1. Ureteral calculus  XR ABDOMEN (KUB) (SINGLE AP VIEW)           PLAN:  You may experience waves of pain and/or nausea for the next 24-72 hrs. You may also experience burning with urination, frequency, urgency, bladder spasms, and blood in the urine. All of this should continue to improve over the next several days. The blood in the urine can last up to two weeks. 1) take toradol OR ibuprofen (motrin) 600 mg (3 of the 200mg tabs) every 6 hours WITH FOOD for the next 72 hours. 2) take Flomax for the next 72 hours.   3) drink at least 80 oz fluid (water, juice, Gatorade - NOT tea, coffee, soda pop) daily    For pain not controlled by toradol or ibuprofen as directed as needed. For nausea use zofran or phenergan    Call our office [phone] or go to ER (if after normal office hours) if you develop fever, intractable vomiting, severe/intolerable pain.        F/U in 6 weeks with a KUB prior or sooner if needed

## 2021-11-11 NOTE — PROGRESS NOTES
Pt had ureteral stent placed on 11/9/21 following right HLL. Stent removed via string in office today without difficulty. Pt tolerated removal well.

## 2021-11-11 NOTE — PATIENT INSTRUCTIONS
You may experience waves of pain and/or nausea for the next 24-72 hrs. You may also experience burning with urination, frequency, urgency, bladder spasms, and blood in the urine. All of this should continue to improve over the next several days. The blood in the urine can last up to two weeks. 1) take toradol OR ibuprofen (motrin) 600 mg (3 of the 200mg tabs) every 6 hours WITH FOOD for the next 72 hours. 2) take Flomax for the next 72 hours. 3) drink at least 80 oz fluid (water, juice, Gatorade - NOT tea, coffee, soda pop) daily    For pain not controlled by toradol or ibuprofen as directed as needed. For nausea use zofran or phenergan    Call our office [phone] or go to ER (if after normal office hours) if you develop fever, intractable vomiting, severe/intolerable pain.

## 2021-11-15 ENCOUNTER — TELEPHONE (OUTPATIENT)
Dept: UROLOGY | Age: 66
End: 2021-11-15

## 2021-11-15 ENCOUNTER — OFFICE VISIT (OUTPATIENT)
Dept: UROLOGY | Age: 66
End: 2021-11-15
Payer: MEDICARE

## 2021-11-15 ENCOUNTER — HOSPITAL ENCOUNTER (OUTPATIENT)
Dept: CT IMAGING | Age: 66
Discharge: HOME | End: 2021-11-17
Payer: MEDICARE

## 2021-11-15 ENCOUNTER — HOSPITAL ENCOUNTER (OUTPATIENT)
Age: 66
Setting detail: SPECIMEN
Discharge: HOME | End: 2021-11-15
Payer: MEDICARE

## 2021-11-15 VITALS
SYSTOLIC BLOOD PRESSURE: 119 MMHG | HEART RATE: 89 BPM | BODY MASS INDEX: 33.82 KG/M2 | TEMPERATURE: 97.5 F | DIASTOLIC BLOOD PRESSURE: 88 MMHG | WEIGHT: 179 LBS

## 2021-11-15 DIAGNOSIS — N20.0 RENAL CALCULUS: ICD-10-CM

## 2021-11-15 DIAGNOSIS — N23 RENAL COLIC: ICD-10-CM

## 2021-11-15 DIAGNOSIS — N20.0 RENAL CALCULUS: Primary | ICD-10-CM

## 2021-11-15 DIAGNOSIS — N23 RENAL COLIC: Primary | ICD-10-CM

## 2021-11-15 LAB
-: ABNORMAL
AMORPHOUS: ABNORMAL
BACTERIA: ABNORMAL
BILIRUBIN URINE: ABNORMAL
CASTS UA: ABNORMAL /LPF
COLOR: ABNORMAL
COMMENT UA: ABNORMAL
CRYSTALS, UA: ABNORMAL /HPF
EPITHELIAL CELLS UA: ABNORMAL /HPF (ref 0–25)
GLUCOSE URINE: NEGATIVE
KETONES, URINE: ABNORMAL
LEUKOCYTE ESTERASE, URINE: ABNORMAL
MUCUS: ABNORMAL
NITRITE, URINE: NEGATIVE
OTHER OBSERVATIONS UA: ABNORMAL
PH UA: 6.5 (ref 5–9)
PROTEIN UA: ABNORMAL
RBC UA: ABNORMAL /HPF (ref 0–2)
RENAL EPITHELIAL, UA: ABNORMAL /HPF
SPECIFIC GRAVITY UA: 1.01 (ref 1.01–1.02)
TRICHOMONAS: ABNORMAL
TURBIDITY: ABNORMAL
URINE HGB: ABNORMAL
UROBILINOGEN, URINE: NORMAL
WBC UA: ABNORMAL /HPF (ref 0–5)
YEAST: ABNORMAL

## 2021-11-15 PROCEDURE — 81001 URINALYSIS AUTO W/SCOPE: CPT

## 2021-11-15 PROCEDURE — 3017F COLORECTAL CA SCREEN DOC REV: CPT | Performed by: UROLOGY

## 2021-11-15 PROCEDURE — 99211 OFF/OP EST MAY X REQ PHY/QHP: CPT

## 2021-11-15 PROCEDURE — 1036F TOBACCO NON-USER: CPT | Performed by: UROLOGY

## 2021-11-15 PROCEDURE — G8427 DOCREV CUR MEDS BY ELIG CLIN: HCPCS | Performed by: UROLOGY

## 2021-11-15 PROCEDURE — 99214 OFFICE O/P EST MOD 30 MIN: CPT | Performed by: UROLOGY

## 2021-11-15 PROCEDURE — G8400 PT W/DXA NO RESULTS DOC: HCPCS | Performed by: UROLOGY

## 2021-11-15 PROCEDURE — 1090F PRES/ABSN URINE INCON ASSESS: CPT | Performed by: UROLOGY

## 2021-11-15 PROCEDURE — 74176 CT ABD & PELVIS W/O CONTRAST: CPT

## 2021-11-15 PROCEDURE — 1123F ACP DISCUSS/DSCN MKR DOCD: CPT | Performed by: UROLOGY

## 2021-11-15 PROCEDURE — G8417 CALC BMI ABV UP PARAM F/U: HCPCS | Performed by: UROLOGY

## 2021-11-15 PROCEDURE — 4040F PNEUMOC VAC/ADMIN/RCVD: CPT | Performed by: UROLOGY

## 2021-11-15 PROCEDURE — 87086 URINE CULTURE/COLONY COUNT: CPT

## 2021-11-15 PROCEDURE — G8484 FLU IMMUNIZE NO ADMIN: HCPCS | Performed by: UROLOGY

## 2021-11-15 RX ORDER — PROMETHAZINE HYDROCHLORIDE 25 MG/1
SUPPOSITORY RECTAL
COMMUNITY
Start: 2021-11-11

## 2021-11-15 RX ORDER — PROMETHAZINE HYDROCHLORIDE 12.5 MG/1
12.5 TABLET ORAL 3 TIMES DAILY PRN
Qty: 12 TABLET | Refills: 0 | Status: SHIPPED | OUTPATIENT
Start: 2021-11-15 | End: 2021-11-22

## 2021-11-15 RX ORDER — KETOROLAC TROMETHAMINE 10 MG/1
10 TABLET, FILM COATED ORAL EVERY 6 HOURS PRN
Qty: 20 TABLET | Refills: 0 | Status: SHIPPED | OUTPATIENT
Start: 2021-11-15 | End: 2022-11-15

## 2021-11-15 ASSESSMENT — ENCOUNTER SYMPTOMS
COLOR CHANGE: 0
BACK PAIN: 0
EYE PAIN: 0
WHEEZING: 0
VOMITING: 0
ABDOMINAL PAIN: 0
NAUSEA: 1
CONSTIPATION: 1
EYE REDNESS: 0
SHORTNESS OF BREATH: 0
COUGH: 0

## 2021-11-15 NOTE — PROGRESS NOTES
HPI:        Patient is a 74 y.o. female in no acute distress. She is alert and oriented to person, place, and time. History  11/2021 right HLL     Currently  Patient is being seen here today after being seen in emergency department. Patient did have her stent pulled last week. Patient did have recent CT scan. This film was independently reviewed. Patient does have hydroureteronephrosis on the right side. There is no obstructing stone. This film was not yet been read by radiology. Patient has been having flank pain.     Past Medical History:   Diagnosis Date    Cerebral artery occlusion     rt. middle artery -- per patient    Cerebral artery occlusion with cerebral infarction ([facility] [address] [address]     several mini strokes; last one 2015    GERD (gastroesophageal reflux disease)     Hypertension     PONV (postoperative nausea and vomiting)      Past Surgical History:   Procedure Laterality Date    APPENDECTOMY      BREAST BIOPSY Right     CARPAL TUNNEL RELEASE Bilateral     COLONOSCOPY  2019    CYSTOSCOPY Right 11/09/2021    CYSTOSCOPY Right 11/9/2021    CYSTOSCOPY URETEROSCOPY LASER - HLL performed by Romana Salk, MD at [facility] Right 11/9/2021    CYSTOSCOPY URETERAL STENT INSERTION performed by Romana Salk, MD at [address] [place] [address] Right     index finger    HEMORRHOID SURGERY      2018    TOE SURGERY Left     4th toe    TONSILLECTOMY      TUBAL LIGATION       Outpatient Encounter Medications as of 11/15/2021   Medication Sig Dispense Refill    promethazine (PHENERGAN) 25 MG suppository unwrap and insert 1 suppository rectally every 12 hours if needed      ketorolac (TORADOL) 10 MG tablet take 1 tablet by mouth three times a day if needed for pain      sertraline (ZOLOFT) 100 MG tablet       pantoprazole (PROTONIX) 20 MG tablet Take 20 mg by mouth daily      sertraline (ZOLOFT) 50 MG tablet Take 50 mg by mouth daily      HYDROcodone-acetaminophen ([facility]) 5-325 MG per tablet take 1 to 2 tablets by mouth six times a day if needed (Patient not taking: Reported on 11/15/2021)      ondansetron (ZOFRAN-ODT) 4 MG disintegrating tablet dissolve 1 tablet ON TONGUE every 6 hours if needed for nausea and vomiting (Patient not taking: Reported on 11/15/2021)      tamsulosin (FLOMAX) 0.4 MG capsule take 1 capsule by mouth once daily (Patient not taking: Reported on 11/15/2021)      amLODIPine-valsartan (EXFORGE) 5-160 MG per tablet  (Patient not taking: Reported on 11/15/2021)      cetirizine (ZYRTEC) 10 MG tablet Take 10 mg by mouth daily (Patient not taking: Reported on 11/15/2021)      Lactobacillus-Inulin (CULTURELLE DIGESTIVE DAILY PO) Take by mouth (Patient not taking: Reported on 11/15/2021)       No facility-administered encounter medications on file as of 11/15/2021.       Current Outpatient Medications on File Prior to Visit   Medication Sig Dispense Refill    promethazine (PHENERGAN) 25 MG suppository unwrap and insert 1 suppository rectally every 12 hours if needed      ketorolac (TORADOL) 10 MG tablet take 1 tablet by mouth three times a day if needed for pain      sertraline (ZOLOFT) 100 MG tablet       pantoprazole (PROTONIX) 20 MG tablet Take 20 mg by mouth daily      sertraline (ZOLOFT) 50 MG tablet Take 50 mg by mouth daily      HYDROcodone-acetaminophen (NORCO) 5-325 MG per tablet take 1 to 2 tablets by mouth six times a day if needed (Patient not taking: Reported on 11/15/2021)      ondansetron (ZOFRAN-ODT) 4 MG disintegrating tablet dissolve 1 tablet ON TONGUE every 6 hours if needed for nausea and vomiting (Patient not taking: Reported on 11/15/2021)      tamsulosin (FLOMAX) 0.4 MG capsule take 1 capsule by mouth once daily (Patient not taking: Reported on 11/15/2021)      amLODIPine-valsartan (EXFORGE) 5-160 MG per tablet  (Patient not taking: Reported on 11/15/2021)      cetirizine (ZYRTEC) 10 MG tablet Take 10 mg by mouth daily (Patient not taking: Reported on 11/15/2021)      Lactobacillus-Inulin (CULTURELLE DIGESTIVE DAILY PO) Take by mouth (Patient not taking: Reported on 11/15/2021)       No current facility-administered medications on file prior to visit. Macrodantin [nitrofurantoin] and Sulfa antibiotics  History reviewed. No pertinent family history. Social History     Tobacco Use   Smoking Status Former Smoker    Quit date: 7/12/2018    Years since quitting: 3.3   Smokeless Tobacco Never Used       Social History     Substance and Sexual Activity   Alcohol Use Not Currently       Review of Systems   Constitutional: Negative for appetite change, chills and fever. Eyes: Negative for pain, redness and visual disturbance. Respiratory: Negative for cough, shortness of breath and wheezing. Cardiovascular: Negative for chest pain and leg swelling. Gastrointestinal: Positive for constipation and nausea. Negative for abdominal pain and vomiting. Genitourinary: Positive for flank pain. Negative for difficulty urinating, dysuria, frequency, hematuria, pelvic pain, vaginal bleeding and vaginal discharge. Musculoskeletal: Negative for back pain, joint swelling and myalgias. Skin: Negative for color change, rash and wound. Neurological: Negative for dizziness, tremors and numbness. Hematological: Negative for adenopathy. Does not bruise/bleed easily. /88 (Site: Left Upper Arm, Position: Sitting, Cuff Size: Medium Adult)   Pulse 89   Temp 97.5 °F (36.4 °C)   Wt 179 lb (81.2 kg)   BMI 33.82 kg/m²       PHYSICAL EXAM:  Constitutional: Patient resting comfortably, in no acute distress. Neuro: Alert and oriented to person place and time. Cranial nerves grossly intact.     Psych: Mood and affect normal.  Skin: Warm, dry  HEENT: normocephalic, atraumatic  Lymphatics: No palpable lymphadenopathy  Lungs: Respiratory effort normal, unlabored  Cardiovascular:  Normal peripheral pulses  Abdomen: Soft, non-tender, non-distended with no organomegaly or palpable masses. : No CVA tenderness. Bladder non-tender and not distended. Pelvic: deferred    No results found for: BUN  No results found for: CREATININE    ASSESSMENT:  This is a 74 y.o. female with the following diagnoses:   Diagnosis Orders   1. Renal calculus  XR ABDOMEN (KUB) (SINGLE AP VIEW)    Culture, Urine    Urinalysis with Microscopic   2. Renal colic  ketorolac (TORADOL) 10 MG tablet    promethazine (PHENERGAN) 12.5 MG tablet         PLAN:  We did refill her Toradol today. We ordered her some Phenergan. We also will check her urine for culture and sensitivity. I did cancel her 6-week appointment. We will move her up in 6 months with a repeat KUB. Patient has also had some abdominal discomfort and difficulty with bowel movements. I did tell her to remain on the MiraLAX for up to 1 additional week.

## 2021-11-15 NOTE — TELEPHONE ENCOUNTER
Marialuisa Noel had a right HLL done on 11/9/2021. She is c/o low back pain and bladder pain. Pain level is an 8. Ibuprofen is not touching the pain. She is not able to eat without bloating, abd pain and vomitting. No fever. Phenergan was helpful, she only has one left, she is also out of the flomax. No pain and no burning with urination. She is drinking fluids and passing stones. She does not want to go to ER d/t cost.  [facility] in [place] is pharmacy.

## 2021-11-16 LAB
CULTURE: NORMAL
Lab: NORMAL
SPECIMEN DESCRIPTION: NORMAL

## 2021-11-17 ENCOUNTER — TELEPHONE (OUTPATIENT)
Dept: UROLOGY | Age: 66
End: 2021-11-17

## 2021-11-17 NOTE — TELEPHONE ENCOUNTER
----- Message from Mary Crawford PA-C sent at 11/17/2021  9:14 AM EST -----  Please call pt - urine culture reviewed and does not show UTI

## 2024-01-26 ENCOUNTER — HOSPITAL ENCOUNTER
Dept: HOSPITAL 101 - LAB | Age: 69
Discharge: HOME | End: 2024-01-26
Payer: MEDICARE

## 2024-01-26 DIAGNOSIS — E78.5: Primary | ICD-10-CM

## 2024-01-26 DIAGNOSIS — I67.89: ICD-10-CM

## 2024-01-26 DIAGNOSIS — I10: ICD-10-CM

## 2024-01-26 LAB
ADD MANUAL DIFF: NO
ALANINE AMINOTRANSFERASE: 22 U/L (ref 14–59)
ALBUMIN GLOBULIN RATIO: 0.9
ALBUMIN LEVEL: 3.7 G/DL (ref 3.4–5)
ALKALINE PHOSPHATASE: 98 U/L (ref 46–116)
ANION GAP: 11.1
ASPARTATE AMINO TRANSFERASE: 17 U/L (ref 15–37)
BLOOD UREA NITROGEN: 11 MG/DL (ref 7–18)
CALCIUM: 9.2 MG/DL (ref 8.5–10.1)
CARBON DIOXIDE: 29.2 MMOL/L (ref 21–32)
CHLORIDE: 104 MMOL/L (ref 98–107)
CHOLESTEROL: 310 MG/DL (ref ?–200)
CO2 BLD-SCNC: 29.2 MMOL/L (ref 21–32)
ESTIMATED GFR (AFRICAN AMERICA: >60 (ref 60–?)
ESTIMATED GFR (NON-AFRICAN AME: >60 (ref 60–?)
GLOBULIN: 3.9 G/DL
GLUCOSE BLD-MCNC: 89 MG/DL (ref 74–106)
HCT VFR BLD CALC: 45.3 % (ref 36–48)
HDL CHOLESTEROL: 57 MG/DL (ref 40–60)
HEMATOCRIT: 45.3 % (ref 36–48)
HEMOGLOBIN: 14.6 G/DL (ref 12–16)
IMMATURE GRANULOCYTES ABS AUTO: 0.02 10^3/UL (ref 0–0.03)
IMMATURE GRANULOCYTES PCT AUTO: 0.3 % (ref 0–0.5)
LYMPHOCYTES  ABSOLUTE AUTO: 2.3 10^3/UL (ref 1.2–3.8)
MCV RBC: 93 FL (ref 81–99)
MEAN CORPUSCULAR HEMOGLOBIN: 30 PG (ref 26.7–34)
MEAN CORPUSCULAR HGB CONC: 32.2 G/DL (ref 29.9–35.2)
MEAN CORPUSCULAR VOLUME: 93 FL (ref 81–99)
PLATELET # BLD: 211 10^3/UL (ref 150–450)
PLATELET COUNT: 211 10^3/UL (ref 150–450)
POTASSIUM SERPLBLD-SCNC: 4.3 MMOL/L (ref 3.5–5.1)
POTASSIUM: 4.3 MMOL/L (ref 3.5–5.1)
RED BLOOD COUNT: 4.87 10^6/UL (ref 4.2–5.4)
SODIUM BLD-SCNC: 140 MMOL/L (ref 136–145)
SODIUM: 140 MMOL/L (ref 136–145)
TOTAL PROTEIN: 7.6 G/DL (ref 6.4–8.2)
TRIGLYCERIDES: 121 MG/DL (ref ?–150)
VLDL CHOLESTEROL: 24.2 MG/DL
WBC # BLD: 8 10^3/UL (ref 4–11)
WHITE BLOOD COUNT: 8 10^3/UL (ref 4–11)

## 2024-01-26 PROCEDURE — 80053 COMPREHEN METABOLIC PANEL: CPT

## 2024-01-26 PROCEDURE — 80061 LIPID PANEL: CPT

## 2024-01-26 PROCEDURE — 85025 COMPLETE CBC W/AUTO DIFF WBC: CPT

## 2024-01-26 PROCEDURE — 36415 COLL VENOUS BLD VENIPUNCTURE: CPT

## 2024-05-21 ENCOUNTER — HOSPITAL ENCOUNTER (EMERGENCY)
Age: 69
Discharge: HOME | End: 2024-05-21
Payer: MEDICARE

## 2024-05-21 VITALS — SYSTOLIC BLOOD PRESSURE: 179 MMHG | DIASTOLIC BLOOD PRESSURE: 114 MMHG | HEART RATE: 71 BPM

## 2024-05-21 VITALS — HEART RATE: 62 BPM

## 2024-05-21 VITALS
DIASTOLIC BLOOD PRESSURE: 113 MMHG | TEMPERATURE: 98.6 F | HEART RATE: 87 BPM | OXYGEN SATURATION: 95 % | SYSTOLIC BLOOD PRESSURE: 174 MMHG

## 2024-05-21 VITALS — SYSTOLIC BLOOD PRESSURE: 127 MMHG | DIASTOLIC BLOOD PRESSURE: 78 MMHG | OXYGEN SATURATION: 94 % | HEART RATE: 63 BPM

## 2024-05-21 VITALS — HEART RATE: 70 BPM | DIASTOLIC BLOOD PRESSURE: 103 MMHG | OXYGEN SATURATION: 94 % | SYSTOLIC BLOOD PRESSURE: 182 MMHG

## 2024-05-21 VITALS — OXYGEN SATURATION: 93 % | HEART RATE: 68 BPM

## 2024-05-21 VITALS — OXYGEN SATURATION: 95 % | HEART RATE: 65 BPM

## 2024-05-21 VITALS — DIASTOLIC BLOOD PRESSURE: 113 MMHG | SYSTOLIC BLOOD PRESSURE: 174 MMHG

## 2024-05-21 VITALS — SYSTOLIC BLOOD PRESSURE: 186 MMHG | DIASTOLIC BLOOD PRESSURE: 113 MMHG | HEART RATE: 77 BPM

## 2024-05-21 VITALS — HEART RATE: 59 BPM | OXYGEN SATURATION: 94 %

## 2024-05-21 VITALS — OXYGEN SATURATION: 94 % | HEART RATE: 59 BPM

## 2024-05-21 VITALS — HEART RATE: 58 BPM | OXYGEN SATURATION: 94 %

## 2024-05-21 VITALS — OXYGEN SATURATION: 95 % | HEART RATE: 57 BPM

## 2024-05-21 VITALS — HEART RATE: 75 BPM

## 2024-05-21 VITALS — HEART RATE: 64 BPM | OXYGEN SATURATION: 93 %

## 2024-05-21 VITALS — HEART RATE: 71 BPM

## 2024-05-21 VITALS — HEART RATE: 69 BPM | OXYGEN SATURATION: 92 % | SYSTOLIC BLOOD PRESSURE: 164 MMHG | DIASTOLIC BLOOD PRESSURE: 104 MMHG

## 2024-05-21 VITALS — BODY MASS INDEX: 29.5 KG/M2

## 2024-05-21 VITALS — HEART RATE: 74 BPM

## 2024-05-21 VITALS — HEART RATE: 66 BPM

## 2024-05-21 VITALS — OXYGEN SATURATION: 96 % | HEART RATE: 79 BPM

## 2024-05-21 DIAGNOSIS — Z79.899: ICD-10-CM

## 2024-05-21 DIAGNOSIS — R41.3: Primary | ICD-10-CM

## 2024-05-21 LAB
ADD MANUAL DIFF: NO
ANION GAP: 9
BLOOD UREA NITROGEN: 13 MG/DL (ref 7–18)
CALCIUM: 9.4 MG/DL (ref 8.5–10.1)
CARBON DIOXIDE: 28.8 MMOL/L (ref 21–32)
CAST SEEN?: (no result) #/LPF
CHLORIDE: 101 MMOL/L (ref 98–107)
ESTIMATED GFR (AFRICAN AMERICA: >60 (ref 60–?)
ESTIMATED GFR (NON-AFRICAN AME: >60 (ref 60–?)
GLUCOSE URINE UA: NEGATIVE MG/DL
GLUCOSE: 96 MG/DL (ref 74–106)
HEMATOCRIT: 43.8 % (ref 36–48)
HEMOGLOBIN: 14.4 G/DL (ref 12–16)
IMMATURE GRANULOCYTES ABS AUTO: 0.04 10^3/UL (ref 0–0.03)
IMMATURE GRANULOCYTES PCT AUTO: 0.3 % (ref 0–0.5)
LYMPHOCYTES  ABSOLUTE AUTO: 2.9 10^3/UL (ref 1.2–3.8)
MCV RBC: 92.2 FL (ref 81–99)
MEAN CORPUSCULAR HEMOGLOBIN: 30.3 PG (ref 26.7–34)
MEAN CORPUSCULAR HGB CONC: 32.9 G/DL (ref 29.9–35.2)
PLATELET COUNT: 204 10^3/UL (ref 150–450)
POTASSIUM: 3.8 MMOL/L (ref 3.5–5.1)
RED BLOOD COUNT: 4.75 10^6/UL (ref 4.2–5.4)
SODIUM: 135 MMOL/L (ref 136–145)
WHITE BLOOD COUNT: 12.2 10^3/UL (ref 4–11)

## 2024-05-21 PROCEDURE — 85025 COMPLETE CBC W/AUTO DIFF WBC: CPT

## 2024-05-21 PROCEDURE — 93005 ELECTROCARDIOGRAM TRACING: CPT

## 2024-05-21 PROCEDURE — 71045 X-RAY EXAM CHEST 1 VIEW: CPT

## 2024-05-21 PROCEDURE — 70450 CT HEAD/BRAIN W/O DYE: CPT

## 2024-05-21 PROCEDURE — 99285 EMERGENCY DEPT VISIT HI MDM: CPT

## 2024-05-21 PROCEDURE — 36415 COLL VENOUS BLD VENIPUNCTURE: CPT

## 2024-05-21 PROCEDURE — 81001 URINALYSIS AUTO W/SCOPE: CPT

## 2024-05-21 PROCEDURE — 80048 BASIC METABOLIC PNL TOTAL CA: CPT

## 2024-05-21 PROCEDURE — 36416 COLLJ CAPILLARY BLOOD SPEC: CPT

## 2024-05-21 PROCEDURE — 73030 X-RAY EXAM OF SHOULDER: CPT

## 2024-07-26 ENCOUNTER — HOSPITAL ENCOUNTER
Dept: HOSPITAL 101 - LAB | Age: 69
Discharge: HOME | End: 2024-07-26
Payer: MEDICARE

## 2024-07-26 DIAGNOSIS — E78.5: Primary | ICD-10-CM

## 2024-07-26 DIAGNOSIS — I10: ICD-10-CM

## 2024-07-26 LAB
ADD MANUAL DIFF: NO
ALANINE AMINOTRANSFERASE: 18 U/L (ref 14–59)
ALBUMIN GLOBULIN RATIO: 1.1
ALBUMIN LEVEL: 3.7 G/DL (ref 3.4–5)
ALKALINE PHOSPHATASE: 107 U/L (ref 46–116)
ANION GAP: 11.1
ASPARTATE AMINO TRANSFERASE: 20 U/L (ref 15–37)
BLOOD UREA NITROGEN: 14 MG/DL (ref 7–18)
CALCIUM: 9 MG/DL (ref 8.5–10.1)
CARBON DIOXIDE: 28.9 MMOL/L (ref 21–32)
CHLORIDE: 105 MMOL/L (ref 98–107)
CHOLESTEROL: 305 MG/DL (ref ?–200)
CO2 BLD-SCNC: 28.9 MMOL/L (ref 21–32)
ESTIMATED GFR (AFRICAN AMERICA: >60 (ref 60–?)
ESTIMATED GFR (NON-AFRICAN AME: >60 (ref 60–?)
GLOBULIN: 3.4 G/DL
GLUCOSE BLD-MCNC: 91 MG/DL (ref 74–106)
HCT VFR BLD CALC: 42.3 % (ref 36–48)
HDL CHOLESTEROL: 53 MG/DL (ref 40–60)
HEMATOCRIT: 42.3 % (ref 36–48)
HEMOGLOBIN: 13.7 G/DL (ref 12–16)
IMMATURE GRANULOCYTES ABS AUTO: 0.02 10^3/UL (ref 0–0.03)
IMMATURE GRANULOCYTES PCT AUTO: 0.2 % (ref 0–0.5)
LYMPHOCYTES  ABSOLUTE AUTO: 2.3 10^3/UL (ref 1.2–3.8)
MCV RBC: 92.4 FL (ref 81–99)
MEAN CORPUSCULAR HEMOGLOBIN: 29.9 PG (ref 26.7–34)
MEAN CORPUSCULAR HGB CONC: 32.4 G/DL (ref 29.9–35.2)
MEAN CORPUSCULAR VOLUME: 92.4 FL (ref 81–99)
PLATELET # BLD: 203 10^3/UL (ref 150–450)
PLATELET COUNT: 203 10^3/UL (ref 150–450)
POTASSIUM SERPLBLD-SCNC: 4 MMOL/L (ref 3.5–5.1)
POTASSIUM: 4 MMOL/L (ref 3.5–5.1)
RED BLOOD COUNT: 4.58 10^6/UL (ref 4.2–5.4)
SODIUM BLD-SCNC: 141 MMOL/L (ref 136–145)
SODIUM: 141 MMOL/L (ref 136–145)
TOTAL PROTEIN: 7.1 G/DL (ref 6.4–8.2)
TRIGLYCERIDES: 117 MG/DL (ref ?–150)
VLDL CHOLESTEROL: 23.4 MG/DL
WBC # BLD: 8.5 10^3/UL (ref 4–11)
WHITE BLOOD COUNT: 8.5 10^3/UL (ref 4–11)

## 2024-07-26 PROCEDURE — 80053 COMPREHEN METABOLIC PANEL: CPT

## 2024-07-26 PROCEDURE — 80061 LIPID PANEL: CPT

## 2024-07-26 PROCEDURE — 85025 COMPLETE CBC W/AUTO DIFF WBC: CPT

## 2024-07-26 PROCEDURE — 36415 COLL VENOUS BLD VENIPUNCTURE: CPT

## 2025-02-24 ENCOUNTER — HOSPITAL ENCOUNTER (EMERGENCY)
Age: 70
Discharge: HOME | End: 2025-02-24
Payer: MEDICARE

## 2025-02-24 VITALS
TEMPERATURE: 98.6 F | SYSTOLIC BLOOD PRESSURE: 144 MMHG | DIASTOLIC BLOOD PRESSURE: 102 MMHG | HEART RATE: 78 BPM | OXYGEN SATURATION: 95 %

## 2025-02-24 VITALS — BODY MASS INDEX: 27.4 KG/M2

## 2025-02-24 VITALS — SYSTOLIC BLOOD PRESSURE: 155 MMHG | OXYGEN SATURATION: 98 % | DIASTOLIC BLOOD PRESSURE: 90 MMHG

## 2025-02-24 DIAGNOSIS — N39.0: ICD-10-CM

## 2025-02-24 DIAGNOSIS — F17.210: ICD-10-CM

## 2025-02-24 DIAGNOSIS — J18.9: Primary | ICD-10-CM

## 2025-02-24 LAB
CAST SEEN?: (no result) #/LPF
GLUCOSE URINE UA: NEGATIVE MG/DL
SARS-COV-2 AG: NEGATIVE
URINE CULTURE INDICATED: NO

## 2025-02-24 PROCEDURE — 99284 EMERGENCY DEPT VISIT MOD MDM: CPT

## 2025-02-24 PROCEDURE — 71046 X-RAY EXAM CHEST 2 VIEWS: CPT

## 2025-02-24 PROCEDURE — 87811 SARS-COV-2 COVID19 W/OPTIC: CPT

## 2025-02-24 PROCEDURE — 81001 URINALYSIS AUTO W/SCOPE: CPT

## 2025-02-24 PROCEDURE — 87804 INFLUENZA ASSAY W/OPTIC: CPT

## 2025-04-16 ENCOUNTER — HOSPITAL ENCOUNTER
Age: 70
Discharge: HOME | End: 2025-04-16
Payer: MEDICARE

## 2025-04-16 DIAGNOSIS — I10: ICD-10-CM

## 2025-04-16 DIAGNOSIS — E78.5: Primary | ICD-10-CM

## 2025-04-16 LAB
ADD MANUAL DIFF: NO
ALANINE AMINOTRANSFERASE: 15 U/L (ref 14–59)
ALBUMIN GLOBULIN RATIO: 1.1
ALBUMIN LEVEL: 3.6 G/DL (ref 3.4–5)
ALKALINE PHOSPHATASE: 92 U/L (ref 46–116)
ANION GAP: 9.5
ASPARTATE AMINO TRANSFERASE: 14 U/L (ref 15–37)
CALCIUM: 9.1 MG/DL (ref 8.5–10.1)
CHLORIDE: 106 MMOL/L (ref 98–107)
CHOLESTEROL: 309 MG/DL (ref ?–200)
CO2 BLD-SCNC: 30.7 MMOL/L (ref 21–32)
ESTIMATED GFR (AFRICAN AMERICA: >60
ESTIMATED GFR (NON-AFRICAN AME: >60
GLOBULIN: 3.4 G/DL
GLUCOSE SERPLBLD-MCNC: 91 MG/DL (ref 74–106)
HCT VFR BLD CALC: 41.5 % (ref 36–48)
HDL CHOLESTEROL: 63 MG/DL (ref 40–60)
HEMOGLOBIN: 13.9 G/DL (ref 12–16)
IMMATURE GRANULOCYTES ABS AUTO: 0.01 10^3/UL (ref 0–0.03)
IMMATURE GRANULOCYTES PCT AUTO: 0.1 % (ref 0–0.5)
LYMPHOCYTES  ABSOLUTE AUTO: 2.4 10^3/UL (ref 1.2–3.8)
MCH RBC QN AUTO: 30.4 PG (ref 26.7–34)
MCV RBC: 90.8 FL (ref 81–99)
MEAN CORPUSCULAR HGB CONC: 33.5 G/DL (ref 29.9–35.2)
PLATELET # BLD: 190 10^3/UL (ref 150–450)
POTASSIUM SERPLBLD-SCNC: 4.2 MMOL/L (ref 3.5–5.1)
RBC # BLD AUTO: 4.57 10^6/UL (ref 4.2–5.4)
SODIUM BLD-SCNC: 142 MMOL/L (ref 136–145)
TRIGLYCERIDES: 137 MG/DL (ref ?–150)
VLDL CHOLESTEROL: 27.4 MG/DL
WBC # BLD: 8.4 10^3/UL (ref 4–11)

## 2025-04-16 PROCEDURE — 85025 COMPLETE CBC W/AUTO DIFF WBC: CPT

## 2025-04-16 PROCEDURE — 80053 COMPREHEN METABOLIC PANEL: CPT

## 2025-04-16 PROCEDURE — 80061 LIPID PANEL: CPT

## 2025-04-16 PROCEDURE — 36415 COLL VENOUS BLD VENIPUNCTURE: CPT

## 2025-05-31 ENCOUNTER — HOSPITAL ENCOUNTER (INPATIENT)
Dept: HOSPITAL 101 - ER | Age: 70
LOS: 2 days | Discharge: HOME HEALTH | DRG: 66 | End: 2025-06-02
Payer: MEDICARE

## 2025-05-31 VITALS — HEART RATE: 59 BPM

## 2025-05-31 VITALS — SYSTOLIC BLOOD PRESSURE: 167 MMHG | HEART RATE: 76 BPM | DIASTOLIC BLOOD PRESSURE: 102 MMHG | OXYGEN SATURATION: 93 %

## 2025-05-31 VITALS
OXYGEN SATURATION: 93 % | TEMPERATURE: 98.24 F | SYSTOLIC BLOOD PRESSURE: 150 MMHG | HEART RATE: 60 BPM | DIASTOLIC BLOOD PRESSURE: 82 MMHG

## 2025-05-31 VITALS — DIASTOLIC BLOOD PRESSURE: 89 MMHG | OXYGEN SATURATION: 94 % | HEART RATE: 56 BPM | SYSTOLIC BLOOD PRESSURE: 155 MMHG

## 2025-05-31 VITALS — SYSTOLIC BLOOD PRESSURE: 209 MMHG | OXYGEN SATURATION: 93 % | DIASTOLIC BLOOD PRESSURE: 93 MMHG

## 2025-05-31 VITALS
DIASTOLIC BLOOD PRESSURE: 94 MMHG | OXYGEN SATURATION: 92 % | HEART RATE: 60 BPM | TEMPERATURE: 98 F | SYSTOLIC BLOOD PRESSURE: 157 MMHG

## 2025-05-31 VITALS — DIASTOLIC BLOOD PRESSURE: 97 MMHG | SYSTOLIC BLOOD PRESSURE: 165 MMHG | HEART RATE: 61 BPM

## 2025-05-31 VITALS
OXYGEN SATURATION: 91 % | HEART RATE: 59 BPM | SYSTOLIC BLOOD PRESSURE: 147 MMHG | DIASTOLIC BLOOD PRESSURE: 77 MMHG | TEMPERATURE: 98.3 F

## 2025-05-31 VITALS — HEART RATE: 61 BPM

## 2025-05-31 VITALS — BODY MASS INDEX: 3 KG/M2 | BODY MASS INDEX: 28.2 KG/M2 | BODY MASS INDEX: 28.3 KG/M2

## 2025-05-31 VITALS — SYSTOLIC BLOOD PRESSURE: 167 MMHG | OXYGEN SATURATION: 93 % | DIASTOLIC BLOOD PRESSURE: 102 MMHG

## 2025-05-31 VITALS — OXYGEN SATURATION: 94 %

## 2025-05-31 VITALS — TEMPERATURE: 98.4 F

## 2025-05-31 VITALS — HEART RATE: 52 BPM

## 2025-05-31 DIAGNOSIS — I63.541: Primary | ICD-10-CM

## 2025-05-31 DIAGNOSIS — F41.1: ICD-10-CM

## 2025-05-31 DIAGNOSIS — F17.200: ICD-10-CM

## 2025-05-31 DIAGNOSIS — R51.9: ICD-10-CM

## 2025-05-31 DIAGNOSIS — I69.398: ICD-10-CM

## 2025-05-31 DIAGNOSIS — H54.7: ICD-10-CM

## 2025-05-31 DIAGNOSIS — J44.9: ICD-10-CM

## 2025-05-31 DIAGNOSIS — F03.90: ICD-10-CM

## 2025-05-31 DIAGNOSIS — H53.8: ICD-10-CM

## 2025-05-31 DIAGNOSIS — E78.00: ICD-10-CM

## 2025-05-31 DIAGNOSIS — F32.9: ICD-10-CM

## 2025-05-31 DIAGNOSIS — I10: ICD-10-CM

## 2025-05-31 LAB
ADD MANUAL DIFF: NO
ALANINE AMINOTRANSFERASE: 18 U/L (ref 14–59)
ALBUMIN GLOBULIN RATIO: 0.9
ALBUMIN LEVEL: 3.8 G/DL (ref 3.4–5)
ALKALINE PHOSPHATASE: 111 U/L (ref 46–116)
AMORPH SED URNS QL MICRO: (no result)
ANION GAP: 12.6
ASPARTATE AMINO TRANSFERASE: 20 U/L (ref 15–37)
BACTERIA URINE: (no result) #/HPF
BASOPHILS # BLD AUTO: 0 10^3/UL (ref 0–0.1)
BASOPHILS NFR BLD AUTO: 0.3 % (ref 0.2–2)
BILIRUBIN TOTAL: 0.6 MG/DL (ref 0.2–1)
BILIRUBIN URINE: NEGATIVE
BUN SERPL-MCNC: 10 MG/DL (ref 7–18)
BUN/CREAT SERPL: 21.7
CALCIUM: 9.9 MG/DL (ref 8.5–10.1)
CAST SEEN?: (no result) #/LPF
CHLORIDE: 107 MMOL/L (ref 98–107)
CLARITY UR: CLEAR
CO2 BLD-SCNC: 28.2 MMOL/L (ref 21–32)
COARSE GRAN CASTS URNS QL MICRO: (no result)
COLOR UR: (no result)
CREAT SERPL-SCNC: 0.46 MG/DL (ref 0.55–1.02)
EOSINOPHIL # BLD AUTO: 0.2 10^3/UL (ref 0–0.7)
EOSINOPHIL NFR BLD AUTO: 2.2 % (ref 0.9–7)
ERYTHROCYTE [DISTWIDTH] IN BLOOD BY AUTOMATED COUNT: 13.7 % (ref 11–15)
ESTIMATED GFR (AFRICAN AMERICA: >60
ESTIMATED GFR (NON-AFRICAN AME: >60
GLOBULIN: 4.2 G/DL
GLUCOSE SERPLBLD-MCNC: 98 MG/DL (ref 74–106)
GLUCOSE URINE UA: NEGATIVE MG/DL
HCT VFR BLD CALC: 47.1 % (ref 36–48)
HEMOGLOBIN: 15.7 G/DL (ref 12–16)
HGB UR QL: (no result)
IMMATURE GRANULOCYTES ABS AUTO: 0.03 10^3/UL (ref 0–0.03)
IMMATURE GRANULOCYTES PCT AUTO: 0.4 % (ref 0–0.5)
INR: 1.03
KETONES URINE: NEGATIVE MG/DL
LEUKOCYTE ESTERASE UR QL: NEGATIVE
LYMPHOCYTES  ABSOLUTE AUTO: 2 10^3/UL (ref 1.2–3.8)
LYMPHOCYTES PERCENT AUTO: 25.6 % (ref 20.5–60)
Lab: (no result) #/HPF
Lab: YES
MAGNESIUM: 2.3 MG/DL (ref 1.8–2.4)
MCH RBC QN AUTO: 30 PG (ref 26.7–34)
MCV RBC: 90.1 FL (ref 81–99)
MEAN CORPUSCULAR HGB CONC: 33.3 G/DL (ref 29.9–35.2)
MEAN PLATELET VOLUME: 11.2 FL (ref 9.5–13.5)
MONOCYTES # BLD AUTO: 0.5 10^3/UL (ref 0.3–0.8)
MONOCYTES NFR BLD AUTO: 7 % (ref 1.7–12)
MUCUS URINE: (no result)
NEUTROPHILS # BLD AUTO: 5 10^3/UL (ref 1.4–6.5)
NEUTROPHILS NFR BLD AUTO: 64.5 % (ref 43–75)
NITRITE URINE: NEGATIVE
PH UR: 6 [PH] (ref 5–9)
PLATELET # BLD: 185 10^3/UL (ref 150–450)
POTASSIUM SERPLBLD-SCNC: 3.8 MMOL/L (ref 3.5–5.1)
PROTEIN URINE: NEGATIVE MG/DL
PROTHROMBIN TIME: 10.9 SEC (ref 9–11.6)
RBC # BLD AUTO: 5.23 10^6/UL (ref 4.2–5.4)
RBC URINE: (no result) #/HPF (ref 0–2)
SODIUM BLD-SCNC: 144 MMOL/L (ref 136–145)
SPECIFIC GRAVITY URINE: <=1.005 (ref 1–1.02)
SPERM # UR AUTO: (no result) /UL
SQUAMOUS EPITHELIAL CELL URINE: (no result) #/LPF
TOTAL PROTEIN: 8 G/DL (ref 6.4–8.2)
TROPONIN I HIGH SENSITIVITY: 7.9 PG/ML (ref 4–51.3)
URINE CULTURE INDICATED: NO
UROBILINOGEN UR QL: 0.2 EU/DL (ref 0.2–1)
WBC # BLD: 7.8 10^3/UL (ref 4–11)
WBC URINE: (no result) #/HPF

## 2025-05-31 PROCEDURE — 70496 CT ANGIOGRAPHY HEAD: CPT

## 2025-05-31 PROCEDURE — 85025 COMPLETE CBC W/AUTO DIFF WBC: CPT

## 2025-05-31 PROCEDURE — 96374 THER/PROPH/DIAG INJ IV PUSH: CPT

## 2025-05-31 PROCEDURE — 70450 CT HEAD/BRAIN W/O DYE: CPT

## 2025-05-31 PROCEDURE — 70551 MRI BRAIN STEM W/O DYE: CPT

## 2025-05-31 PROCEDURE — 96375 TX/PRO/DX INJ NEW DRUG ADDON: CPT

## 2025-05-31 PROCEDURE — 99285 EMERGENCY DEPT VISIT HI MDM: CPT

## 2025-05-31 PROCEDURE — 97161 PT EVAL LOW COMPLEX 20 MIN: CPT

## 2025-05-31 PROCEDURE — 93306 TTE W/DOPPLER COMPLETE: CPT

## 2025-05-31 PROCEDURE — 84484 ASSAY OF TROPONIN QUANT: CPT

## 2025-05-31 PROCEDURE — 93005 ELECTROCARDIOGRAM TRACING: CPT

## 2025-05-31 PROCEDURE — 70498 CT ANGIOGRAPHY NECK: CPT

## 2025-05-31 PROCEDURE — 36415 COLL VENOUS BLD VENIPUNCTURE: CPT

## 2025-05-31 PROCEDURE — 85610 PROTHROMBIN TIME: CPT

## 2025-05-31 PROCEDURE — 81001 URINALYSIS AUTO W/SCOPE: CPT

## 2025-05-31 PROCEDURE — 80053 COMPREHEN METABOLIC PANEL: CPT

## 2025-05-31 PROCEDURE — 99406 BEHAV CHNG SMOKING 3-10 MIN: CPT

## 2025-05-31 PROCEDURE — 97165 OT EVAL LOW COMPLEX 30 MIN: CPT

## 2025-05-31 PROCEDURE — 93246 EXT ECG>7D<15D RECORDING: CPT

## 2025-05-31 PROCEDURE — 94761 N-INVAS EAR/PLS OXIMETRY MLT: CPT

## 2025-05-31 PROCEDURE — 83735 ASSAY OF MAGNESIUM: CPT

## 2025-05-31 RX ADMIN — KETOROLAC TROMETHAMINE 15 MG: 30 INJECTION, SOLUTION INTRAMUSCULAR; INTRAVENOUS at 12:28

## 2025-05-31 RX ADMIN — BUSPIRONE HYDROCHLORIDE 5 MG: 10 TABLET ORAL at 20:48

## 2025-05-31 RX ADMIN — ASPIRIN 81 MG 324 MG: 81 TABLET ORAL at 14:24

## 2025-05-31 RX ADMIN — ONDANSETRON 4 MG: 2 INJECTION INTRAMUSCULAR; INTRAVENOUS at 12:28

## 2025-06-01 VITALS — HEART RATE: 70 BPM

## 2025-06-01 VITALS
OXYGEN SATURATION: 94 % | TEMPERATURE: 98.06 F | SYSTOLIC BLOOD PRESSURE: 153 MMHG | HEART RATE: 71 BPM | DIASTOLIC BLOOD PRESSURE: 85 MMHG

## 2025-06-01 VITALS
HEART RATE: 61 BPM | SYSTOLIC BLOOD PRESSURE: 138 MMHG | OXYGEN SATURATION: 90 % | DIASTOLIC BLOOD PRESSURE: 85 MMHG | TEMPERATURE: 98.3 F

## 2025-06-01 VITALS
DIASTOLIC BLOOD PRESSURE: 88 MMHG | OXYGEN SATURATION: 93 % | TEMPERATURE: 98.06 F | HEART RATE: 67 BPM | SYSTOLIC BLOOD PRESSURE: 158 MMHG

## 2025-06-01 VITALS — SYSTOLIC BLOOD PRESSURE: 162 MMHG | DIASTOLIC BLOOD PRESSURE: 83 MMHG | HEART RATE: 71 BPM

## 2025-06-01 VITALS — HEART RATE: 60 BPM

## 2025-06-01 VITALS — OXYGEN SATURATION: 93 %

## 2025-06-01 VITALS — OXYGEN SATURATION: 94 %

## 2025-06-01 VITALS — HEART RATE: 62 BPM

## 2025-06-01 VITALS — HEART RATE: 63 BPM

## 2025-06-01 VITALS
HEART RATE: 65 BPM | OXYGEN SATURATION: 93 % | DIASTOLIC BLOOD PRESSURE: 96 MMHG | TEMPERATURE: 97.9 F | SYSTOLIC BLOOD PRESSURE: 139 MMHG

## 2025-06-01 VITALS
TEMPERATURE: 97.9 F | OXYGEN SATURATION: 94 % | DIASTOLIC BLOOD PRESSURE: 94 MMHG | SYSTOLIC BLOOD PRESSURE: 168 MMHG | HEART RATE: 66 BPM

## 2025-06-01 VITALS — HEART RATE: 79 BPM

## 2025-06-01 VITALS — OXYGEN SATURATION: 90 %

## 2025-06-01 VITALS — HEART RATE: 54 BPM

## 2025-06-01 RX ADMIN — CETIRIZINE HYDROCHLORIDE 10 MG: 10 TABLET, FILM COATED ORAL at 08:59

## 2025-06-01 RX ADMIN — BUSPIRONE HYDROCHLORIDE 5 MG: 10 TABLET ORAL at 08:59

## 2025-06-01 RX ADMIN — LOSARTAN POTASSIUM 100 MG: 50 TABLET, FILM COATED ORAL at 08:58

## 2025-06-01 RX ADMIN — ASPIRIN 325 MG: 325 TABLET, COATED ORAL at 09:16

## 2025-06-01 RX ADMIN — SERTRALINE HYDROCHLORIDE 100 MG: 100 TABLET ORAL at 22:53

## 2025-06-01 RX ADMIN — ONDANSETRON 4 MG: 2 INJECTION INTRAMUSCULAR; INTRAVENOUS at 08:11

## 2025-06-01 RX ADMIN — AMLODIPINE BESYLATE 5 MG: 5 TABLET ORAL at 10:31

## 2025-06-01 RX ADMIN — SERTRALINE HYDROCHLORIDE 50 MG: 50 TABLET ORAL at 09:00

## 2025-06-01 RX ADMIN — MONTELUKAST 10 MG: 10 TABLET, FILM COATED ORAL at 08:59

## 2025-06-01 RX ADMIN — ACETAMINOPHEN 1000 MG: 500 TABLET ORAL at 18:46

## 2025-06-01 RX ADMIN — BUSPIRONE HYDROCHLORIDE 5 MG: 10 TABLET ORAL at 22:53

## 2025-06-01 RX ADMIN — AMLODIPINE BESYLATE 5 MG: 5 TABLET ORAL at 08:58

## 2025-06-02 VITALS
OXYGEN SATURATION: 91 % | DIASTOLIC BLOOD PRESSURE: 74 MMHG | TEMPERATURE: 97.5 F | SYSTOLIC BLOOD PRESSURE: 144 MMHG | HEART RATE: 56 BPM

## 2025-06-02 VITALS
OXYGEN SATURATION: 95 % | SYSTOLIC BLOOD PRESSURE: 155 MMHG | HEART RATE: 65 BPM | DIASTOLIC BLOOD PRESSURE: 85 MMHG | TEMPERATURE: 97.7 F

## 2025-06-02 VITALS
DIASTOLIC BLOOD PRESSURE: 90 MMHG | TEMPERATURE: 97.88 F | OXYGEN SATURATION: 93 % | HEART RATE: 63 BPM | SYSTOLIC BLOOD PRESSURE: 153 MMHG

## 2025-06-02 VITALS — HEART RATE: 59 BPM

## 2025-06-02 VITALS — HEART RATE: 53 BPM

## 2025-06-02 VITALS — HEART RATE: 62 BPM

## 2025-06-02 VITALS — HEART RATE: 68 BPM | OXYGEN SATURATION: 92 %

## 2025-06-02 VITALS — HEART RATE: 83 BPM

## 2025-06-02 VITALS — HEART RATE: 52 BPM

## 2025-06-02 RX ADMIN — MONTELUKAST 10 MG: 10 TABLET, FILM COATED ORAL at 10:09

## 2025-06-02 RX ADMIN — LOSARTAN POTASSIUM 100 MG: 50 TABLET, FILM COATED ORAL at 10:09

## 2025-06-02 RX ADMIN — ASPIRIN 325 MG: 325 TABLET, COATED ORAL at 10:10

## 2025-06-02 RX ADMIN — AMLODIPINE BESYLATE 10 MG: 5 TABLET ORAL at 10:10

## 2025-06-02 RX ADMIN — HYOSCYAMINE SULFATE 0.12 MG: 0.12 TABLET SUBLINGUAL at 04:14

## 2025-06-02 RX ADMIN — CETIRIZINE HYDROCHLORIDE 10 MG: 10 TABLET, FILM COATED ORAL at 10:10

## 2025-06-02 RX ADMIN — ONDANSETRON 4 MG: 2 INJECTION INTRAMUSCULAR; INTRAVENOUS at 04:14

## 2025-06-02 RX ADMIN — SERTRALINE HYDROCHLORIDE 50 MG: 50 TABLET ORAL at 10:15

## 2025-06-02 RX ADMIN — BUSPIRONE HYDROCHLORIDE 5 MG: 10 TABLET ORAL at 10:10

## 2025-06-07 ENCOUNTER — HOSPITAL ENCOUNTER (EMERGENCY)
Age: 70
Discharge: HOME | End: 2025-06-07
Payer: MEDICARE

## 2025-06-07 VITALS
OXYGEN SATURATION: 94 % | TEMPERATURE: 98.2 F | SYSTOLIC BLOOD PRESSURE: 162 MMHG | HEART RATE: 72 BPM | DIASTOLIC BLOOD PRESSURE: 93 MMHG

## 2025-06-07 VITALS — BODY MASS INDEX: 28.3 KG/M2

## 2025-06-07 VITALS — OXYGEN SATURATION: 96 % | HEART RATE: 75 BPM

## 2025-06-07 VITALS — HEART RATE: 67 BPM | OXYGEN SATURATION: 95 %

## 2025-06-07 VITALS — SYSTOLIC BLOOD PRESSURE: 135 MMHG | DIASTOLIC BLOOD PRESSURE: 84 MMHG

## 2025-06-07 VITALS — HEART RATE: 69 BPM | SYSTOLIC BLOOD PRESSURE: 150 MMHG | DIASTOLIC BLOOD PRESSURE: 93 MMHG | OXYGEN SATURATION: 93 %

## 2025-06-07 VITALS — OXYGEN SATURATION: 93 % | HEART RATE: 67 BPM

## 2025-06-07 VITALS — SYSTOLIC BLOOD PRESSURE: 162 MMHG | OXYGEN SATURATION: 95 % | DIASTOLIC BLOOD PRESSURE: 93 MMHG

## 2025-06-07 VITALS — OXYGEN SATURATION: 94 %

## 2025-06-07 DIAGNOSIS — Z86.73: ICD-10-CM

## 2025-06-07 DIAGNOSIS — F17.200: ICD-10-CM

## 2025-06-07 DIAGNOSIS — I10: Primary | ICD-10-CM

## 2025-06-07 LAB
ADD MANUAL DIFF: NO
ANION GAP: 12.1
BASOPHILS # BLD AUTO: 0 10^3/UL (ref 0–0.1)
BASOPHILS NFR BLD AUTO: 0.3 % (ref 0.2–2)
BUN SERPL-MCNC: 14 MG/DL (ref 7–18)
BUN/CREAT SERPL: 21.5
CALCIUM: 9.5 MG/DL (ref 8.5–10.1)
CHLORIDE: 105 MMOL/L (ref 98–107)
CO2 BLD-SCNC: 30.7 MMOL/L (ref 21–32)
CREAT SERPL-SCNC: 0.65 MG/DL (ref 0.55–1.02)
EOSINOPHIL # BLD AUTO: 0.1 10^3/UL (ref 0–0.7)
EOSINOPHIL NFR BLD AUTO: 1.1 % (ref 0.9–7)
ERYTHROCYTE [DISTWIDTH] IN BLOOD BY AUTOMATED COUNT: 13.2 % (ref 11–15)
ESTIMATED GFR (AFRICAN AMERICA: >60
ESTIMATED GFR (NON-AFRICAN AME: >60
GLUCOSE SERPLBLD-MCNC: 130 MG/DL (ref 74–106)
HCT VFR BLD CALC: 44.1 % (ref 36–48)
HEMOGLOBIN: 15 G/DL (ref 12–16)
IMMATURE GRANULOCYTES ABS AUTO: 0.02 10^3/UL (ref 0–0.03)
IMMATURE GRANULOCYTES PCT AUTO: 0.2 % (ref 0–0.5)
LYMPHOCYTES  ABSOLUTE AUTO: 1.9 10^3/UL (ref 1.2–3.8)
LYMPHOCYTES PERCENT AUTO: 19.9 % (ref 20.5–60)
MCH RBC QN AUTO: 30.1 PG (ref 26.7–34)
MCV RBC: 88.6 FL (ref 81–99)
MEAN CORPUSCULAR HGB CONC: 34 G/DL (ref 29.9–35.2)
MEAN PLATELET VOLUME: 10.3 FL (ref 9.5–13.5)
MONOCYTES # BLD AUTO: 0.6 10^3/UL (ref 0.3–0.8)
MONOCYTES NFR BLD AUTO: 6 % (ref 1.7–12)
NEUTROPHILS # BLD AUTO: 7 10^3/UL (ref 1.4–6.5)
NEUTROPHILS NFR BLD AUTO: 72.5 % (ref 43–75)
PLATELET # BLD: 190 10^3/UL (ref 150–450)
POTASSIUM SERPLBLD-SCNC: 3.8 MMOL/L (ref 3.5–5.1)
RBC # BLD AUTO: 4.98 10^6/UL (ref 4.2–5.4)
SODIUM BLD-SCNC: 144 MMOL/L (ref 136–145)
TROPONIN I HIGH SENSITIVITY: 11.8 PG/ML (ref 4–51.3)
WBC # BLD: 9.7 10^3/UL (ref 4–11)

## 2025-06-07 PROCEDURE — 93005 ELECTROCARDIOGRAM TRACING: CPT

## 2025-06-07 PROCEDURE — 84484 ASSAY OF TROPONIN QUANT: CPT

## 2025-06-07 PROCEDURE — 36415 COLL VENOUS BLD VENIPUNCTURE: CPT

## 2025-06-07 PROCEDURE — 80048 BASIC METABOLIC PNL TOTAL CA: CPT

## 2025-06-07 PROCEDURE — 85025 COMPLETE CBC W/AUTO DIFF WBC: CPT

## 2025-06-07 PROCEDURE — 99284 EMERGENCY DEPT VISIT MOD MDM: CPT

## 2025-06-08 ENCOUNTER — HOSPITAL ENCOUNTER (EMERGENCY)
Age: 70
Discharge: HOME | End: 2025-06-08
Payer: MEDICARE

## 2025-06-08 VITALS
OXYGEN SATURATION: 94 % | SYSTOLIC BLOOD PRESSURE: 132 MMHG | TEMPERATURE: 98.6 F | HEART RATE: 75 BPM | DIASTOLIC BLOOD PRESSURE: 78 MMHG

## 2025-06-08 VITALS — SYSTOLIC BLOOD PRESSURE: 145 MMHG | HEART RATE: 72 BPM | DIASTOLIC BLOOD PRESSURE: 83 MMHG | OXYGEN SATURATION: 94 %

## 2025-06-08 VITALS — BODY MASS INDEX: 28.3 KG/M2

## 2025-06-08 DIAGNOSIS — R51.9: Primary | ICD-10-CM

## 2025-06-08 DIAGNOSIS — Z86.73: ICD-10-CM

## 2025-06-08 LAB
ADD MANUAL DIFF: NO
ANION GAP: 15.2
BASOPHILS # BLD AUTO: 0 10^3/UL (ref 0–0.1)
BASOPHILS NFR BLD AUTO: 0.3 % (ref 0.2–2)
BUN SERPL-MCNC: 14 MG/DL (ref 7–18)
BUN/CREAT SERPL: 23
CALCIUM: 9.5 MG/DL (ref 8.5–10.1)
CHLORIDE: 105 MMOL/L (ref 98–107)
CO2 BLD-SCNC: 25.6 MMOL/L (ref 21–32)
CREAT SERPL-SCNC: 0.61 MG/DL (ref 0.55–1.02)
EOSINOPHIL # BLD AUTO: 0.1 10^3/UL (ref 0–0.7)
EOSINOPHIL NFR BLD AUTO: 0.8 % (ref 0.9–7)
ERYTHROCYTE [DISTWIDTH] IN BLOOD BY AUTOMATED COUNT: 13.2 % (ref 11–15)
ESTIMATED GFR (AFRICAN AMERICA: >60
ESTIMATED GFR (NON-AFRICAN AME: >60
GLUCOSE SERPLBLD-MCNC: 112 MG/DL (ref 74–106)
HCT VFR BLD CALC: 45.3 % (ref 36–48)
HEMOGLOBIN: 15.2 G/DL (ref 12–16)
IMMATURE GRANULOCYTES ABS AUTO: 0.03 10^3/UL (ref 0–0.03)
IMMATURE GRANULOCYTES PCT AUTO: 0.3 % (ref 0–0.5)
LYMPHOCYTES  ABSOLUTE AUTO: 2.6 10^3/UL (ref 1.2–3.8)
LYMPHOCYTES PERCENT AUTO: 24.1 % (ref 20.5–60)
MCH RBC QN AUTO: 29.8 PG (ref 26.7–34)
MCV RBC: 88.8 FL (ref 81–99)
MEAN CORPUSCULAR HGB CONC: 33.6 G/DL (ref 29.9–35.2)
MEAN PLATELET VOLUME: 10.3 FL (ref 9.5–13.5)
MONOCYTES # BLD AUTO: 0.8 10^3/UL (ref 0.3–0.8)
MONOCYTES NFR BLD AUTO: 7.1 % (ref 1.7–12)
NEUTROPHILS # BLD AUTO: 7.3 10^3/UL (ref 1.4–6.5)
NEUTROPHILS NFR BLD AUTO: 67.4 % (ref 43–75)
PLATELET # BLD: 208 10^3/UL (ref 150–450)
POTASSIUM SERPLBLD-SCNC: 3.8 MMOL/L (ref 3.5–5.1)
RBC # BLD AUTO: 5.1 10^6/UL (ref 4.2–5.4)
SODIUM BLD-SCNC: 142 MMOL/L (ref 136–145)
WBC # BLD: 10.9 10^3/UL (ref 4–11)

## 2025-06-08 PROCEDURE — 70450 CT HEAD/BRAIN W/O DYE: CPT

## 2025-06-08 PROCEDURE — 80048 BASIC METABOLIC PNL TOTAL CA: CPT

## 2025-06-08 PROCEDURE — 36415 COLL VENOUS BLD VENIPUNCTURE: CPT

## 2025-06-08 PROCEDURE — 96375 TX/PRO/DX INJ NEW DRUG ADDON: CPT

## 2025-06-08 PROCEDURE — 96374 THER/PROPH/DIAG INJ IV PUSH: CPT

## 2025-06-08 PROCEDURE — 85025 COMPLETE CBC W/AUTO DIFF WBC: CPT

## 2025-06-08 PROCEDURE — 99285 EMERGENCY DEPT VISIT HI MDM: CPT

## 2025-06-08 PROCEDURE — 93005 ELECTROCARDIOGRAM TRACING: CPT

## 2025-06-11 ENCOUNTER — TELEPHONE (OUTPATIENT)
Dept: CARDIOLOGY | Facility: CLINIC | Age: 70
End: 2025-06-11
Payer: OTHER GOVERNMENT

## 2025-06-11 NOTE — TELEPHONE ENCOUNTER
Lm for pt to call and schedule referral appt. [facility] bp, hyperlipidemia,htn

## 2025-06-18 ENCOUNTER — HOSPITAL ENCOUNTER (INPATIENT)
Dept: HOSPITAL 101 - ER | Age: 70
LOS: 2 days | Discharge: HOME HEALTH | DRG: 310 | End: 2025-06-20
Payer: MEDICARE

## 2025-06-18 VITALS — DIASTOLIC BLOOD PRESSURE: 81 MMHG | SYSTOLIC BLOOD PRESSURE: 133 MMHG | OXYGEN SATURATION: 94 % | HEART RATE: 55 BPM

## 2025-06-18 VITALS — HEART RATE: 62 BPM

## 2025-06-18 VITALS — SYSTOLIC BLOOD PRESSURE: 154 MMHG | DIASTOLIC BLOOD PRESSURE: 131 MMHG | HEART RATE: 155 BPM

## 2025-06-18 VITALS — DIASTOLIC BLOOD PRESSURE: 83 MMHG | HEART RATE: 55 BPM | SYSTOLIC BLOOD PRESSURE: 131 MMHG

## 2025-06-18 VITALS — SYSTOLIC BLOOD PRESSURE: 124 MMHG | HEART RATE: 68 BPM | OXYGEN SATURATION: 94 % | DIASTOLIC BLOOD PRESSURE: 83 MMHG

## 2025-06-18 VITALS — HEART RATE: 60 BPM | SYSTOLIC BLOOD PRESSURE: 124 MMHG | OXYGEN SATURATION: 93 % | DIASTOLIC BLOOD PRESSURE: 82 MMHG

## 2025-06-18 VITALS — HEART RATE: 59 BPM | SYSTOLIC BLOOD PRESSURE: 166 MMHG | OXYGEN SATURATION: 96 % | DIASTOLIC BLOOD PRESSURE: 87 MMHG

## 2025-06-18 VITALS — OXYGEN SATURATION: 92 % | HEART RATE: 59 BPM

## 2025-06-18 VITALS — OXYGEN SATURATION: 94 % | HEART RATE: 54 BPM

## 2025-06-18 VITALS — SYSTOLIC BLOOD PRESSURE: 135 MMHG | HEART RATE: 67 BPM | OXYGEN SATURATION: 92 % | DIASTOLIC BLOOD PRESSURE: 96 MMHG

## 2025-06-18 VITALS — SYSTOLIC BLOOD PRESSURE: 120 MMHG | HEART RATE: 57 BPM | OXYGEN SATURATION: 93 % | DIASTOLIC BLOOD PRESSURE: 79 MMHG

## 2025-06-18 VITALS — OXYGEN SATURATION: 93 % | HEART RATE: 63 BPM

## 2025-06-18 VITALS — OXYGEN SATURATION: 92 % | HEART RATE: 60 BPM

## 2025-06-18 VITALS — HEART RATE: 54 BPM | DIASTOLIC BLOOD PRESSURE: 75 MMHG | OXYGEN SATURATION: 94 % | SYSTOLIC BLOOD PRESSURE: 127 MMHG

## 2025-06-18 VITALS — HEART RATE: 61 BPM

## 2025-06-18 VITALS — HEART RATE: 74 BPM

## 2025-06-18 VITALS — HEART RATE: 60 BPM | OXYGEN SATURATION: 96 %

## 2025-06-18 VITALS — OXYGEN SATURATION: 94 % | HEART RATE: 55 BPM

## 2025-06-18 VITALS — HEART RATE: 120 BPM | SYSTOLIC BLOOD PRESSURE: 134 MMHG | DIASTOLIC BLOOD PRESSURE: 93 MMHG

## 2025-06-18 VITALS
OXYGEN SATURATION: 95 % | SYSTOLIC BLOOD PRESSURE: 164 MMHG | DIASTOLIC BLOOD PRESSURE: 108 MMHG | TEMPERATURE: 98.4 F | HEART RATE: 157 BPM

## 2025-06-18 VITALS — HEART RATE: 140 BPM

## 2025-06-18 VITALS — HEART RATE: 64 BPM

## 2025-06-18 VITALS — SYSTOLIC BLOOD PRESSURE: 119 MMHG | HEART RATE: 101 BPM | DIASTOLIC BLOOD PRESSURE: 80 MMHG | OXYGEN SATURATION: 91 %

## 2025-06-18 VITALS — HEART RATE: 163 BPM | OXYGEN SATURATION: 95 %

## 2025-06-18 VITALS — HEART RATE: 75 BPM

## 2025-06-18 VITALS — SYSTOLIC BLOOD PRESSURE: 123 MMHG | DIASTOLIC BLOOD PRESSURE: 79 MMHG | HEART RATE: 63 BPM | OXYGEN SATURATION: 92 %

## 2025-06-18 VITALS — HEART RATE: 68 BPM

## 2025-06-18 VITALS — OXYGEN SATURATION: 95 % | DIASTOLIC BLOOD PRESSURE: 84 MMHG | HEART RATE: 62 BPM | SYSTOLIC BLOOD PRESSURE: 156 MMHG

## 2025-06-18 VITALS — DIASTOLIC BLOOD PRESSURE: 108 MMHG | SYSTOLIC BLOOD PRESSURE: 164 MMHG | OXYGEN SATURATION: 95 % | HEART RATE: 162 BPM

## 2025-06-18 VITALS — HEART RATE: 102 BPM

## 2025-06-18 VITALS — HEART RATE: 54 BPM | OXYGEN SATURATION: 93 %

## 2025-06-18 VITALS — SYSTOLIC BLOOD PRESSURE: 134 MMHG | DIASTOLIC BLOOD PRESSURE: 80 MMHG | HEART RATE: 55 BPM

## 2025-06-18 VITALS — HEART RATE: 63 BPM

## 2025-06-18 VITALS — HEART RATE: 60 BPM | OXYGEN SATURATION: 95 %

## 2025-06-18 VITALS — HEART RATE: 67 BPM | OXYGEN SATURATION: 95 %

## 2025-06-18 VITALS — DIASTOLIC BLOOD PRESSURE: 81 MMHG | SYSTOLIC BLOOD PRESSURE: 136 MMHG | HEART RATE: 58 BPM

## 2025-06-18 VITALS — OXYGEN SATURATION: 91 % | HEART RATE: 133 BPM

## 2025-06-18 VITALS — SYSTOLIC BLOOD PRESSURE: 143 MMHG | HEART RATE: 55 BPM | DIASTOLIC BLOOD PRESSURE: 84 MMHG | OXYGEN SATURATION: 92 %

## 2025-06-18 VITALS — SYSTOLIC BLOOD PRESSURE: 118 MMHG | DIASTOLIC BLOOD PRESSURE: 73 MMHG | HEART RATE: 91 BPM

## 2025-06-18 VITALS — HEART RATE: 59 BPM | OXYGEN SATURATION: 93 %

## 2025-06-18 VITALS — OXYGEN SATURATION: 96 % | HEART RATE: 61 BPM

## 2025-06-18 VITALS — HEART RATE: 65 BPM | SYSTOLIC BLOOD PRESSURE: 146 MMHG | DIASTOLIC BLOOD PRESSURE: 92 MMHG | OXYGEN SATURATION: 97 %

## 2025-06-18 VITALS — SYSTOLIC BLOOD PRESSURE: 139 MMHG | OXYGEN SATURATION: 88 % | DIASTOLIC BLOOD PRESSURE: 84 MMHG | HEART RATE: 62 BPM

## 2025-06-18 VITALS — HEART RATE: 64 BPM | SYSTOLIC BLOOD PRESSURE: 153 MMHG | DIASTOLIC BLOOD PRESSURE: 94 MMHG | OXYGEN SATURATION: 97 %

## 2025-06-18 VITALS — HEART RATE: 156 BPM

## 2025-06-18 VITALS — OXYGEN SATURATION: 91 % | HEART RATE: 63 BPM

## 2025-06-18 VITALS — SYSTOLIC BLOOD PRESSURE: 138 MMHG | HEART RATE: 60 BPM | DIASTOLIC BLOOD PRESSURE: 89 MMHG

## 2025-06-18 VITALS — HEART RATE: 121 BPM | OXYGEN SATURATION: 92 %

## 2025-06-18 VITALS — SYSTOLIC BLOOD PRESSURE: 151 MMHG | HEART RATE: 90 BPM | OXYGEN SATURATION: 94 % | DIASTOLIC BLOOD PRESSURE: 103 MMHG

## 2025-06-18 VITALS — OXYGEN SATURATION: 96 % | HEART RATE: 66 BPM

## 2025-06-18 VITALS — HEART RATE: 70 BPM

## 2025-06-18 VITALS — OXYGEN SATURATION: 100 % | DIASTOLIC BLOOD PRESSURE: 89 MMHG | HEART RATE: 64 BPM | SYSTOLIC BLOOD PRESSURE: 138 MMHG

## 2025-06-18 VITALS — HEART RATE: 66 BPM

## 2025-06-18 VITALS — HEART RATE: 65 BPM

## 2025-06-18 VITALS — HEART RATE: 55 BPM | OXYGEN SATURATION: 93 % | SYSTOLIC BLOOD PRESSURE: 125 MMHG | DIASTOLIC BLOOD PRESSURE: 80 MMHG

## 2025-06-18 VITALS — HEART RATE: 82 BPM | OXYGEN SATURATION: 98 %

## 2025-06-18 VITALS — HEART RATE: 63 BPM | DIASTOLIC BLOOD PRESSURE: 88 MMHG | SYSTOLIC BLOOD PRESSURE: 139 MMHG | OXYGEN SATURATION: 94 %

## 2025-06-18 VITALS — HEART RATE: 97 BPM | OXYGEN SATURATION: 91 % | SYSTOLIC BLOOD PRESSURE: 108 MMHG | DIASTOLIC BLOOD PRESSURE: 78 MMHG

## 2025-06-18 VITALS — HEART RATE: 59 BPM

## 2025-06-18 VITALS — SYSTOLIC BLOOD PRESSURE: 125 MMHG | HEART RATE: 54 BPM | DIASTOLIC BLOOD PRESSURE: 85 MMHG

## 2025-06-18 VITALS — OXYGEN SATURATION: 94 % | HEART RATE: 104 BPM

## 2025-06-18 VITALS — OXYGEN SATURATION: 94 % | HEART RATE: 60 BPM

## 2025-06-18 VITALS — HEART RATE: 106 BPM

## 2025-06-18 VITALS — DIASTOLIC BLOOD PRESSURE: 89 MMHG | SYSTOLIC BLOOD PRESSURE: 133 MMHG | HEART RATE: 58 BPM

## 2025-06-18 VITALS — HEART RATE: 77 BPM

## 2025-06-18 VITALS — HEART RATE: 58 BPM

## 2025-06-18 VITALS — OXYGEN SATURATION: 94 %

## 2025-06-18 VITALS — HEART RATE: 60 BPM | OXYGEN SATURATION: 94 %

## 2025-06-18 VITALS — SYSTOLIC BLOOD PRESSURE: 152 MMHG | DIASTOLIC BLOOD PRESSURE: 113 MMHG | HEART RATE: 61 BPM | OXYGEN SATURATION: 94 %

## 2025-06-18 VITALS — DIASTOLIC BLOOD PRESSURE: 61 MMHG | HEART RATE: 112 BPM | OXYGEN SATURATION: 92 % | SYSTOLIC BLOOD PRESSURE: 132 MMHG

## 2025-06-18 VITALS — DIASTOLIC BLOOD PRESSURE: 100 MMHG | SYSTOLIC BLOOD PRESSURE: 127 MMHG | HEART RATE: 133 BPM | OXYGEN SATURATION: 93 %

## 2025-06-18 VITALS — HEART RATE: 61 BPM | OXYGEN SATURATION: 94 % | DIASTOLIC BLOOD PRESSURE: 82 MMHG | SYSTOLIC BLOOD PRESSURE: 131 MMHG

## 2025-06-18 VITALS — OXYGEN SATURATION: 93 % | HEART RATE: 61 BPM

## 2025-06-18 VITALS — DIASTOLIC BLOOD PRESSURE: 81 MMHG | SYSTOLIC BLOOD PRESSURE: 133 MMHG | HEART RATE: 56 BPM

## 2025-06-18 VITALS — OXYGEN SATURATION: 94 % | HEART RATE: 53 BPM

## 2025-06-18 VITALS — DIASTOLIC BLOOD PRESSURE: 103 MMHG | SYSTOLIC BLOOD PRESSURE: 149 MMHG | HEART RATE: 142 BPM

## 2025-06-18 VITALS — HEART RATE: 65 BPM | OXYGEN SATURATION: 94 %

## 2025-06-18 VITALS — HEART RATE: 60 BPM

## 2025-06-18 VITALS — SYSTOLIC BLOOD PRESSURE: 148 MMHG | DIASTOLIC BLOOD PRESSURE: 117 MMHG | HEART RATE: 157 BPM

## 2025-06-18 VITALS — DIASTOLIC BLOOD PRESSURE: 85 MMHG | HEART RATE: 82 BPM | SYSTOLIC BLOOD PRESSURE: 130 MMHG

## 2025-06-18 VITALS — OXYGEN SATURATION: 92 % | HEART RATE: 63 BPM

## 2025-06-18 VITALS — OXYGEN SATURATION: 92 % | HEART RATE: 64 BPM

## 2025-06-18 VITALS — HEART RATE: 67 BPM

## 2025-06-18 VITALS — HEART RATE: 159 BPM | OXYGEN SATURATION: 95 %

## 2025-06-18 VITALS — BODY MASS INDEX: 3 KG/M2 | BODY MASS INDEX: 27.8 KG/M2 | BODY MASS INDEX: 28.3 KG/M2

## 2025-06-18 VITALS — DIASTOLIC BLOOD PRESSURE: 86 MMHG | SYSTOLIC BLOOD PRESSURE: 140 MMHG | HEART RATE: 58 BPM

## 2025-06-18 VITALS — HEART RATE: 60 BPM | OXYGEN SATURATION: 93 %

## 2025-06-18 VITALS — DIASTOLIC BLOOD PRESSURE: 80 MMHG | OXYGEN SATURATION: 94 % | HEART RATE: 59 BPM | SYSTOLIC BLOOD PRESSURE: 139 MMHG

## 2025-06-18 VITALS — HEART RATE: 61 BPM | OXYGEN SATURATION: 93 %

## 2025-06-18 VITALS — HEART RATE: 73 BPM

## 2025-06-18 VITALS — HEART RATE: 61 BPM | OXYGEN SATURATION: 94 %

## 2025-06-18 VITALS — OXYGEN SATURATION: 95 % | HEART RATE: 58 BPM

## 2025-06-18 VITALS — DIASTOLIC BLOOD PRESSURE: 135 MMHG | SYSTOLIC BLOOD PRESSURE: 158 MMHG | HEART RATE: 124 BPM

## 2025-06-18 VITALS — HEART RATE: 56 BPM | OXYGEN SATURATION: 93 %

## 2025-06-18 VITALS — HEART RATE: 72 BPM

## 2025-06-18 VITALS — HEART RATE: 115 BPM

## 2025-06-18 VITALS — HEART RATE: 56 BPM

## 2025-06-18 VITALS — HEART RATE: 76 BPM

## 2025-06-18 VITALS — HEART RATE: 69 BPM

## 2025-06-18 VITALS — HEART RATE: 139 BPM

## 2025-06-18 VITALS — HEART RATE: 143 BPM

## 2025-06-18 DIAGNOSIS — F03.90: ICD-10-CM

## 2025-06-18 DIAGNOSIS — I69.398: ICD-10-CM

## 2025-06-18 DIAGNOSIS — I10: ICD-10-CM

## 2025-06-18 DIAGNOSIS — I48.91: Primary | ICD-10-CM

## 2025-06-18 DIAGNOSIS — I67.2: ICD-10-CM

## 2025-06-18 DIAGNOSIS — F32.A: ICD-10-CM

## 2025-06-18 DIAGNOSIS — Z79.82: ICD-10-CM

## 2025-06-18 DIAGNOSIS — Z79.02: ICD-10-CM

## 2025-06-18 DIAGNOSIS — H70.11: ICD-10-CM

## 2025-06-18 DIAGNOSIS — F17.210: ICD-10-CM

## 2025-06-18 DIAGNOSIS — I48.92: ICD-10-CM

## 2025-06-18 DIAGNOSIS — E78.00: ICD-10-CM

## 2025-06-18 DIAGNOSIS — H53.9: ICD-10-CM

## 2025-06-18 DIAGNOSIS — J44.9: ICD-10-CM

## 2025-06-18 DIAGNOSIS — F41.9: ICD-10-CM

## 2025-06-18 LAB
ADD MANUAL DIFF: NO
ANION GAP: 12.1
BASOPHILS # BLD AUTO: 0 10^3/UL (ref 0–0.1)
BASOPHILS NFR BLD AUTO: 0.5 % (ref 0.2–2)
BUN SERPL-MCNC: 16 MG/DL (ref 7–18)
BUN/CREAT SERPL: 24.6
CALCIUM: 10 MG/DL (ref 8.5–10.1)
CHLORIDE: 105 MMOL/L (ref 98–107)
CO2 BLD-SCNC: 29.6 MMOL/L (ref 21–32)
CREAT SERPL-SCNC: 0.65 MG/DL (ref 0.55–1.02)
EOSINOPHIL # BLD AUTO: 0.2 10^3/UL (ref 0–0.7)
EOSINOPHIL NFR BLD AUTO: 2.7 % (ref 0.9–7)
ERYTHROCYTE [DISTWIDTH] IN BLOOD BY AUTOMATED COUNT: 13.2 % (ref 11–15)
ESTIMATED GFR (AFRICAN AMERICA: >60
ESTIMATED GFR (NON-AFRICAN AME: >60
GLUCOSE SERPLBLD-MCNC: 113 MG/DL (ref 74–106)
HCT VFR BLD CALC: 46.9 % (ref 36–48)
HEMOGLOBIN: 15.6 G/DL (ref 12–16)
IMMATURE GRANULOCYTES ABS AUTO: 0.02 10^3/UL (ref 0–0.03)
IMMATURE GRANULOCYTES PCT AUTO: 0.2 % (ref 0–0.5)
INR: 0.98
LYMPHOCYTES  ABSOLUTE AUTO: 2.8 10^3/UL (ref 1.2–3.8)
LYMPHOCYTES PERCENT AUTO: 34 % (ref 20.5–60)
MCH RBC QN AUTO: 30.1 PG (ref 26.7–34)
MCV RBC: 90.4 FL (ref 81–99)
MEAN CORPUSCULAR HGB CONC: 33.3 G/DL (ref 29.9–35.2)
MEAN PLATELET VOLUME: 10.3 FL (ref 9.5–13.5)
MONOCYTES # BLD AUTO: 0.9 10^3/UL (ref 0.3–0.8)
MONOCYTES NFR BLD AUTO: 10.6 % (ref 1.7–12)
NEUTROPHILS # BLD AUTO: 4.2 10^3/UL (ref 1.4–6.5)
NEUTROPHILS NFR BLD AUTO: 52 % (ref 43–75)
NT PRO B TYPE NATRIURETIC PEPT: 358 PG/ML (ref ?–900)
PARTIAL THROMBOPLASTIN TIME: 29.2 SEC (ref 22.3–36.2)
PLATELET # BLD: 210 10^3/UL (ref 150–450)
POTASSIUM SERPLBLD-SCNC: 3.7 MMOL/L (ref 3.5–5.1)
PROTHROMBIN TIME: 10.4 SEC (ref 9–11.6)
PTT HEPARIN MONITOR: 43.5 SEC (ref 43.5–61.5)
PTT HEPARIN MONITOR: 43.6 SEC (ref 43.5–61.5)
RBC # BLD AUTO: 5.19 10^6/UL (ref 4.2–5.4)
SODIUM BLD-SCNC: 143 MMOL/L (ref 136–145)
THYROID STIMULATING HORMONE: 1.87 UIU/ML (ref 0.36–3.74)
TROPONIN I HIGH SENSITIVITY: 19.2 PG/ML (ref 4–51.3)
TROPONIN I HIGH SENSITIVITY: 19.5 PG/ML (ref 4–51.3)
TROPONIN I HIGH SENSITIVITY: 20.9 PG/ML (ref 4–51.3)
WBC # BLD: 8.1 10^3/UL (ref 4–11)

## 2025-06-18 PROCEDURE — 97535 SELF CARE MNGMENT TRAINING: CPT

## 2025-06-18 PROCEDURE — 83880 ASSAY OF NATRIURETIC PEPTIDE: CPT

## 2025-06-18 PROCEDURE — 93005 ELECTROCARDIOGRAM TRACING: CPT

## 2025-06-18 PROCEDURE — 96368 THER/DIAG CONCURRENT INF: CPT

## 2025-06-18 PROCEDURE — 99285 EMERGENCY DEPT VISIT HI MDM: CPT

## 2025-06-18 PROCEDURE — 80053 COMPREHEN METABOLIC PANEL: CPT

## 2025-06-18 PROCEDURE — 96366 THER/PROPH/DIAG IV INF ADDON: CPT

## 2025-06-18 PROCEDURE — 97110 THERAPEUTIC EXERCISES: CPT

## 2025-06-18 PROCEDURE — 83735 ASSAY OF MAGNESIUM: CPT

## 2025-06-18 PROCEDURE — 85730 THROMBOPLASTIN TIME PARTIAL: CPT

## 2025-06-18 PROCEDURE — 84443 ASSAY THYROID STIM HORMONE: CPT

## 2025-06-18 PROCEDURE — 85025 COMPLETE CBC W/AUTO DIFF WBC: CPT

## 2025-06-18 PROCEDURE — 97530 THERAPEUTIC ACTIVITIES: CPT

## 2025-06-18 PROCEDURE — 96376 TX/PRO/DX INJ SAME DRUG ADON: CPT

## 2025-06-18 PROCEDURE — 36415 COLL VENOUS BLD VENIPUNCTURE: CPT

## 2025-06-18 PROCEDURE — 97161 PT EVAL LOW COMPLEX 20 MIN: CPT

## 2025-06-18 PROCEDURE — 84484 ASSAY OF TROPONIN QUANT: CPT

## 2025-06-18 PROCEDURE — 80048 BASIC METABOLIC PNL TOTAL CA: CPT

## 2025-06-18 PROCEDURE — 96367 TX/PROPH/DG ADDL SEQ IV INF: CPT

## 2025-06-18 PROCEDURE — 96365 THER/PROPH/DIAG IV INF INIT: CPT

## 2025-06-18 PROCEDURE — 71045 X-RAY EXAM CHEST 1 VIEW: CPT

## 2025-06-18 PROCEDURE — G0378 HOSPITAL OBSERVATION PER HR: HCPCS

## 2025-06-18 PROCEDURE — 70450 CT HEAD/BRAIN W/O DYE: CPT

## 2025-06-18 PROCEDURE — 96375 TX/PRO/DX INJ NEW DRUG ADDON: CPT

## 2025-06-18 PROCEDURE — 85610 PROTHROMBIN TIME: CPT

## 2025-06-18 PROCEDURE — 97165 OT EVAL LOW COMPLEX 30 MIN: CPT

## 2025-06-18 RX ADMIN — SERTRALINE HYDROCHLORIDE 100 MG: 100 TABLET ORAL at 23:33

## 2025-06-18 RX ADMIN — DILTIAZEM HYDROCHLORIDE 10 MG: 5 INJECTION INTRAVENOUS at 03:53

## 2025-06-18 RX ADMIN — ONDANSETRON 4 MG: 2 INJECTION INTRAMUSCULAR; INTRAVENOUS at 04:05

## 2025-06-18 RX ADMIN — DILTIAZEM HYDROCHLORIDE 120 MG: 120 CAPSULE, EXTENDED RELEASE ORAL at 17:30

## 2025-06-18 RX ADMIN — DILTIAZEM HYDROCHLORIDE 10 MG: 5 INJECTION INTRAVENOUS at 03:42

## 2025-06-18 RX ADMIN — HEPARIN SODIUM AND DEXTROSE 16 UNIT: 5000; 5 INJECTION INTRAVENOUS at 07:30

## 2025-06-18 RX ADMIN — HEPARIN SODIUM 2700 UNIT: 5000 INJECTION, SOLUTION INTRAVENOUS; SUBCUTANEOUS at 07:22

## 2025-06-18 RX ADMIN — BUSPIRONE HYDROCHLORIDE 5 MG: 10 TABLET ORAL at 21:31

## 2025-06-18 RX ADMIN — DILTIAZEM HYDROCHLORIDE 10 MG: 5 INJECTION INTRAVENOUS at 18:04

## 2025-06-19 VITALS — DIASTOLIC BLOOD PRESSURE: 77 MMHG | HEART RATE: 51 BPM | SYSTOLIC BLOOD PRESSURE: 122 MMHG

## 2025-06-19 VITALS — HEART RATE: 56 BPM | SYSTOLIC BLOOD PRESSURE: 151 MMHG | DIASTOLIC BLOOD PRESSURE: 72 MMHG

## 2025-06-19 VITALS — HEART RATE: 63 BPM | DIASTOLIC BLOOD PRESSURE: 84 MMHG | SYSTOLIC BLOOD PRESSURE: 157 MMHG

## 2025-06-19 VITALS — HEART RATE: 52 BPM | DIASTOLIC BLOOD PRESSURE: 85 MMHG | SYSTOLIC BLOOD PRESSURE: 134 MMHG

## 2025-06-19 VITALS — SYSTOLIC BLOOD PRESSURE: 105 MMHG | HEART RATE: 54 BPM | DIASTOLIC BLOOD PRESSURE: 61 MMHG

## 2025-06-19 VITALS — HEART RATE: 51 BPM | DIASTOLIC BLOOD PRESSURE: 60 MMHG | SYSTOLIC BLOOD PRESSURE: 113 MMHG

## 2025-06-19 VITALS — HEART RATE: 55 BPM

## 2025-06-19 VITALS — HEART RATE: 61 BPM

## 2025-06-19 VITALS — HEART RATE: 63 BPM | SYSTOLIC BLOOD PRESSURE: 120 MMHG | DIASTOLIC BLOOD PRESSURE: 74 MMHG

## 2025-06-19 VITALS — HEART RATE: 53 BPM

## 2025-06-19 VITALS
HEART RATE: 63 BPM | TEMPERATURE: 97.8 F | SYSTOLIC BLOOD PRESSURE: 142 MMHG | DIASTOLIC BLOOD PRESSURE: 76 MMHG | OXYGEN SATURATION: 96 %

## 2025-06-19 VITALS — HEART RATE: 75 BPM

## 2025-06-19 VITALS — SYSTOLIC BLOOD PRESSURE: 155 MMHG | DIASTOLIC BLOOD PRESSURE: 86 MMHG

## 2025-06-19 VITALS — DIASTOLIC BLOOD PRESSURE: 80 MMHG | SYSTOLIC BLOOD PRESSURE: 133 MMHG | HEART RATE: 69 BPM

## 2025-06-19 VITALS — HEART RATE: 56 BPM

## 2025-06-19 VITALS — HEART RATE: 52 BPM | SYSTOLIC BLOOD PRESSURE: 118 MMHG | DIASTOLIC BLOOD PRESSURE: 70 MMHG

## 2025-06-19 VITALS — DIASTOLIC BLOOD PRESSURE: 84 MMHG | HEART RATE: 55 BPM | SYSTOLIC BLOOD PRESSURE: 142 MMHG

## 2025-06-19 VITALS — SYSTOLIC BLOOD PRESSURE: 110 MMHG | DIASTOLIC BLOOD PRESSURE: 68 MMHG | HEART RATE: 57 BPM

## 2025-06-19 VITALS — HEART RATE: 61 BPM | SYSTOLIC BLOOD PRESSURE: 141 MMHG | DIASTOLIC BLOOD PRESSURE: 88 MMHG

## 2025-06-19 VITALS — OXYGEN SATURATION: 95 %

## 2025-06-19 VITALS — DIASTOLIC BLOOD PRESSURE: 71 MMHG | HEART RATE: 53 BPM | SYSTOLIC BLOOD PRESSURE: 122 MMHG

## 2025-06-19 VITALS — SYSTOLIC BLOOD PRESSURE: 114 MMHG | HEART RATE: 53 BPM | DIASTOLIC BLOOD PRESSURE: 68 MMHG

## 2025-06-19 VITALS — HEART RATE: 59 BPM

## 2025-06-19 VITALS — HEART RATE: 70 BPM

## 2025-06-19 VITALS — DIASTOLIC BLOOD PRESSURE: 68 MMHG | SYSTOLIC BLOOD PRESSURE: 113 MMHG | HEART RATE: 74 BPM

## 2025-06-19 VITALS — HEART RATE: 52 BPM

## 2025-06-19 VITALS — HEART RATE: 62 BPM | SYSTOLIC BLOOD PRESSURE: 110 MMHG | DIASTOLIC BLOOD PRESSURE: 70 MMHG

## 2025-06-19 VITALS — SYSTOLIC BLOOD PRESSURE: 121 MMHG | HEART RATE: 55 BPM | DIASTOLIC BLOOD PRESSURE: 83 MMHG

## 2025-06-19 VITALS — HEART RATE: 67 BPM

## 2025-06-19 VITALS — DIASTOLIC BLOOD PRESSURE: 78 MMHG | SYSTOLIC BLOOD PRESSURE: 131 MMHG | HEART RATE: 59 BPM

## 2025-06-19 VITALS — HEART RATE: 77 BPM

## 2025-06-19 VITALS — HEART RATE: 72 BPM

## 2025-06-19 VITALS — SYSTOLIC BLOOD PRESSURE: 122 MMHG | HEART RATE: 60 BPM | DIASTOLIC BLOOD PRESSURE: 75 MMHG

## 2025-06-19 VITALS — DIASTOLIC BLOOD PRESSURE: 84 MMHG | SYSTOLIC BLOOD PRESSURE: 120 MMHG | HEART RATE: 56 BPM

## 2025-06-19 VITALS — HEART RATE: 66 BPM | SYSTOLIC BLOOD PRESSURE: 149 MMHG | DIASTOLIC BLOOD PRESSURE: 89 MMHG

## 2025-06-19 VITALS — DIASTOLIC BLOOD PRESSURE: 61 MMHG | HEART RATE: 53 BPM | SYSTOLIC BLOOD PRESSURE: 103 MMHG

## 2025-06-19 VITALS — TEMPERATURE: 98.2 F | HEART RATE: 71 BPM

## 2025-06-19 VITALS — SYSTOLIC BLOOD PRESSURE: 122 MMHG | DIASTOLIC BLOOD PRESSURE: 73 MMHG | HEART RATE: 69 BPM

## 2025-06-19 VITALS — DIASTOLIC BLOOD PRESSURE: 79 MMHG | HEART RATE: 60 BPM | SYSTOLIC BLOOD PRESSURE: 127 MMHG

## 2025-06-19 VITALS — SYSTOLIC BLOOD PRESSURE: 108 MMHG | HEART RATE: 52 BPM | DIASTOLIC BLOOD PRESSURE: 63 MMHG

## 2025-06-19 VITALS — DIASTOLIC BLOOD PRESSURE: 68 MMHG | HEART RATE: 55 BPM | SYSTOLIC BLOOD PRESSURE: 109 MMHG

## 2025-06-19 VITALS — HEART RATE: 73 BPM

## 2025-06-19 VITALS — HEART RATE: 62 BPM

## 2025-06-19 VITALS — HEART RATE: 54 BPM

## 2025-06-19 VITALS — HEART RATE: 68 BPM

## 2025-06-19 VITALS — OXYGEN SATURATION: 94 % | TEMPERATURE: 97.4 F

## 2025-06-19 VITALS — HEART RATE: 69 BPM

## 2025-06-19 VITALS — HEART RATE: 58 BPM

## 2025-06-19 VITALS — HEART RATE: 60 BPM

## 2025-06-19 VITALS — DIASTOLIC BLOOD PRESSURE: 85 MMHG | SYSTOLIC BLOOD PRESSURE: 128 MMHG | HEART RATE: 53 BPM

## 2025-06-19 VITALS — DIASTOLIC BLOOD PRESSURE: 84 MMHG | SYSTOLIC BLOOD PRESSURE: 144 MMHG

## 2025-06-19 VITALS — HEART RATE: 63 BPM

## 2025-06-19 VITALS — HEART RATE: 65 BPM

## 2025-06-19 VITALS — HEART RATE: 56 BPM | SYSTOLIC BLOOD PRESSURE: 109 MMHG | DIASTOLIC BLOOD PRESSURE: 65 MMHG

## 2025-06-19 VITALS — HEART RATE: 64 BPM

## 2025-06-19 VITALS — HEART RATE: 60 BPM | DIASTOLIC BLOOD PRESSURE: 73 MMHG | SYSTOLIC BLOOD PRESSURE: 134 MMHG

## 2025-06-19 VITALS — HEART RATE: 74 BPM

## 2025-06-19 VITALS — HEART RATE: 51 BPM

## 2025-06-19 VITALS — SYSTOLIC BLOOD PRESSURE: 140 MMHG | HEART RATE: 66 BPM | DIASTOLIC BLOOD PRESSURE: 85 MMHG

## 2025-06-19 VITALS — HEART RATE: 76 BPM

## 2025-06-19 LAB
ADD MANUAL DIFF: NO
ALANINE AMINOTRANSFERASE: 24 U/L (ref 14–59)
ALBUMIN GLOBULIN RATIO: 0.9
ALBUMIN LEVEL: 3.3 G/DL (ref 3.4–5)
ALKALINE PHOSPHATASE: 96 U/L (ref 46–116)
ANION GAP: 11.2
ASPARTATE AMINO TRANSFERASE: 21 U/L (ref 15–37)
BASOPHILS # BLD AUTO: 0 10^3/UL (ref 0–0.1)
BASOPHILS NFR BLD AUTO: 0.4 % (ref 0.2–2)
BILIRUBIN TOTAL: 0.3 MG/DL (ref 0.2–1)
BUN SERPL-MCNC: 18 MG/DL (ref 7–18)
BUN/CREAT SERPL: 36.7
CALCIUM: 9.3 MG/DL (ref 8.5–10.1)
CHLORIDE: 106 MMOL/L (ref 98–107)
CO2 BLD-SCNC: 29.6 MMOL/L (ref 21–32)
CREAT SERPL-SCNC: 0.49 MG/DL (ref 0.55–1.02)
EOSINOPHIL # BLD AUTO: 0.1 10^3/UL (ref 0–0.7)
EOSINOPHIL NFR BLD AUTO: 1.6 % (ref 0.9–7)
ERYTHROCYTE [DISTWIDTH] IN BLOOD BY AUTOMATED COUNT: 13.2 % (ref 11–15)
ESTIMATED GFR (AFRICAN AMERICA: >60
ESTIMATED GFR (NON-AFRICAN AME: >60
GLOBULIN: 3.6 G/DL
GLUCOSE SERPLBLD-MCNC: 117 MG/DL (ref 74–106)
HCT VFR BLD CALC: 43.6 % (ref 36–48)
HEMOGLOBIN: 14.7 G/DL (ref 12–16)
IMMATURE GRANULOCYTES ABS AUTO: 0.02 10^3/UL (ref 0–0.03)
IMMATURE GRANULOCYTES PCT AUTO: 0.3 % (ref 0–0.5)
LYMPHOCYTES  ABSOLUTE AUTO: 2.4 10^3/UL (ref 1.2–3.8)
LYMPHOCYTES PERCENT AUTO: 32.2 % (ref 20.5–60)
MAGNESIUM: 2.1 MG/DL (ref 1.8–2.4)
MCH RBC QN AUTO: 30 PG (ref 26.7–34)
MCV RBC: 89 FL (ref 81–99)
MEAN CORPUSCULAR HGB CONC: 33.7 G/DL (ref 29.9–35.2)
MEAN PLATELET VOLUME: 10.3 FL (ref 9.5–13.5)
MONOCYTES # BLD AUTO: 0.7 10^3/UL (ref 0.3–0.8)
MONOCYTES NFR BLD AUTO: 9.3 % (ref 1.7–12)
NEUTROPHILS # BLD AUTO: 4.1 10^3/UL (ref 1.4–6.5)
NEUTROPHILS NFR BLD AUTO: 56.2 % (ref 43–75)
PLATELET # BLD: 188 10^3/UL (ref 150–450)
POTASSIUM SERPLBLD-SCNC: 3.8 MMOL/L (ref 3.5–5.1)
PTT HEPARIN MONITOR: 45 SEC (ref 43.5–61.5)
RBC # BLD AUTO: 4.9 10^6/UL (ref 4.2–5.4)
SODIUM BLD-SCNC: 143 MMOL/L (ref 136–145)
TOTAL PROTEIN: 6.9 G/DL (ref 6.4–8.2)
WBC # BLD: 7.3 10^3/UL (ref 4–11)

## 2025-06-19 RX ADMIN — BUSPIRONE HYDROCHLORIDE 5 MG: 10 TABLET ORAL at 20:40

## 2025-06-19 RX ADMIN — ASPIRIN 81 MG: 81 TABLET ORAL at 08:24

## 2025-06-19 RX ADMIN — MONTELUKAST 10 MG: 10 TABLET, FILM COATED ORAL at 08:24

## 2025-06-19 RX ADMIN — BUSPIRONE HYDROCHLORIDE 5 MG: 10 TABLET ORAL at 08:24

## 2025-06-19 RX ADMIN — SERTRALINE HYDROCHLORIDE 100 MG: 100 TABLET ORAL at 20:41

## 2025-06-19 RX ADMIN — CETIRIZINE HYDROCHLORIDE 10 MG: 10 TABLET, FILM COATED ORAL at 08:25

## 2025-06-19 RX ADMIN — LOSARTAN POTASSIUM 100 MG: 50 TABLET, FILM COATED ORAL at 08:25

## 2025-06-19 RX ADMIN — METOPROLOL SUCCINATE 25 MG: 25 TABLET, EXTENDED RELEASE ORAL at 10:07

## 2025-06-19 RX ADMIN — SERTRALINE HYDROCHLORIDE 50 MG: 100 TABLET ORAL at 08:24

## 2025-06-19 RX ADMIN — APIXABAN 5 MG: 5 TABLET, FILM COATED ORAL at 16:40

## 2025-06-20 VITALS — SYSTOLIC BLOOD PRESSURE: 139 MMHG | HEART RATE: 70 BPM | DIASTOLIC BLOOD PRESSURE: 96 MMHG

## 2025-06-20 VITALS — HEART RATE: 62 BPM

## 2025-06-20 VITALS — SYSTOLIC BLOOD PRESSURE: 137 MMHG | OXYGEN SATURATION: 97 % | DIASTOLIC BLOOD PRESSURE: 90 MMHG

## 2025-06-20 VITALS — DIASTOLIC BLOOD PRESSURE: 85 MMHG | SYSTOLIC BLOOD PRESSURE: 143 MMHG | HEART RATE: 69 BPM

## 2025-06-20 VITALS
OXYGEN SATURATION: 95 % | SYSTOLIC BLOOD PRESSURE: 161 MMHG | TEMPERATURE: 97.88 F | HEART RATE: 59 BPM | DIASTOLIC BLOOD PRESSURE: 93 MMHG

## 2025-06-20 VITALS — HEART RATE: 65 BPM

## 2025-06-20 VITALS — HEART RATE: 49 BPM

## 2025-06-20 VITALS — TEMPERATURE: 97.88 F | HEART RATE: 56 BPM

## 2025-06-20 VITALS — HEART RATE: 71 BPM

## 2025-06-20 VITALS — OXYGEN SATURATION: 96 %

## 2025-06-20 VITALS — DIASTOLIC BLOOD PRESSURE: 83 MMHG | SYSTOLIC BLOOD PRESSURE: 142 MMHG | OXYGEN SATURATION: 94 % | HEART RATE: 50 BPM

## 2025-06-20 VITALS — HEART RATE: 64 BPM

## 2025-06-20 VITALS — HEART RATE: 56 BPM

## 2025-06-20 VITALS — DIASTOLIC BLOOD PRESSURE: 106 MMHG | SYSTOLIC BLOOD PRESSURE: 151 MMHG | OXYGEN SATURATION: 98 %

## 2025-06-20 VITALS — HEART RATE: 59 BPM

## 2025-06-20 VITALS — TEMPERATURE: 97.6 F

## 2025-06-20 LAB
ADD MANUAL DIFF: NO
ALANINE AMINOTRANSFERASE: 27 U/L (ref 14–59)
ALBUMIN GLOBULIN RATIO: 0.9
ALBUMIN LEVEL: 3.5 G/DL (ref 3.4–5)
ALKALINE PHOSPHATASE: 100 U/L (ref 46–116)
ANION GAP: 13.3
ASPARTATE AMINO TRANSFERASE: 25 U/L (ref 15–37)
BASOPHILS # BLD AUTO: 0 10^3/UL (ref 0–0.1)
BASOPHILS NFR BLD AUTO: 0.4 % (ref 0.2–2)
BILIRUBIN TOTAL: 0.4 MG/DL (ref 0.2–1)
BUN SERPL-MCNC: 19 MG/DL (ref 7–18)
BUN/CREAT SERPL: 36.5
CALCIUM: 9.4 MG/DL (ref 8.5–10.1)
CHLORIDE: 105 MMOL/L (ref 98–107)
CO2 BLD-SCNC: 28.8 MMOL/L (ref 21–32)
CREAT SERPL-SCNC: 0.52 MG/DL (ref 0.55–1.02)
EOSINOPHIL # BLD AUTO: 0.1 10^3/UL (ref 0–0.7)
EOSINOPHIL NFR BLD AUTO: 1.9 % (ref 0.9–7)
ERYTHROCYTE [DISTWIDTH] IN BLOOD BY AUTOMATED COUNT: 13.2 % (ref 11–15)
ESTIMATED GFR (AFRICAN AMERICA: >60
ESTIMATED GFR (NON-AFRICAN AME: >60
GLOBULIN: 3.9 G/DL
GLUCOSE SERPLBLD-MCNC: 99 MG/DL (ref 74–106)
HCT VFR BLD CALC: 46.6 % (ref 36–48)
HEMOGLOBIN: 15.6 G/DL (ref 12–16)
IMMATURE GRANULOCYTES ABS AUTO: 0.02 10^3/UL (ref 0–0.03)
IMMATURE GRANULOCYTES PCT AUTO: 0.3 % (ref 0–0.5)
LYMPHOCYTES  ABSOLUTE AUTO: 2.8 10^3/UL (ref 1.2–3.8)
LYMPHOCYTES PERCENT AUTO: 37.9 % (ref 20.5–60)
MAGNESIUM: 2.1 MG/DL (ref 1.8–2.4)
MCH RBC QN AUTO: 30.2 PG (ref 26.7–34)
MCV RBC: 90.3 FL (ref 81–99)
MEAN CORPUSCULAR HGB CONC: 33.5 G/DL (ref 29.9–35.2)
MEAN PLATELET VOLUME: 10.4 FL (ref 9.5–13.5)
MONOCYTES # BLD AUTO: 0.7 10^3/UL (ref 0.3–0.8)
MONOCYTES NFR BLD AUTO: 9.5 % (ref 1.7–12)
NEUTROPHILS # BLD AUTO: 3.8 10^3/UL (ref 1.4–6.5)
NEUTROPHILS NFR BLD AUTO: 50 % (ref 43–75)
PLATELET # BLD: 197 10^3/UL (ref 150–450)
POTASSIUM SERPLBLD-SCNC: 4.1 MMOL/L (ref 3.5–5.1)
RBC # BLD AUTO: 5.16 10^6/UL (ref 4.2–5.4)
SODIUM BLD-SCNC: 143 MMOL/L (ref 136–145)
TOTAL PROTEIN: 7.4 G/DL (ref 6.4–8.2)
WBC # BLD: 7.5 10^3/UL (ref 4–11)

## 2025-06-20 RX ADMIN — LOSARTAN POTASSIUM 100 MG: 50 TABLET, FILM COATED ORAL at 10:00

## 2025-06-20 RX ADMIN — APIXABAN 5 MG: 5 TABLET, FILM COATED ORAL at 05:32

## 2025-06-20 RX ADMIN — CETIRIZINE HYDROCHLORIDE 10 MG: 10 TABLET, FILM COATED ORAL at 10:02

## 2025-06-20 RX ADMIN — ASPIRIN 81 MG: 81 TABLET ORAL at 10:00

## 2025-06-20 RX ADMIN — SERTRALINE HYDROCHLORIDE 50 MG: 100 TABLET ORAL at 10:03

## 2025-06-20 RX ADMIN — BUSPIRONE HYDROCHLORIDE 5 MG: 10 TABLET ORAL at 10:03

## 2025-06-20 RX ADMIN — METOPROLOL SUCCINATE 25 MG: 25 TABLET, EXTENDED RELEASE ORAL at 10:01

## 2025-06-22 ENCOUNTER — HOSPITAL ENCOUNTER (EMERGENCY)
Age: 70
Discharge: HOME | End: 2025-06-22
Payer: MEDICARE

## 2025-06-22 VITALS
SYSTOLIC BLOOD PRESSURE: 136 MMHG | OXYGEN SATURATION: 95 % | TEMPERATURE: 97.8 F | DIASTOLIC BLOOD PRESSURE: 83 MMHG | HEART RATE: 68 BPM

## 2025-06-22 VITALS — BODY MASS INDEX: 28.3 KG/M2

## 2025-06-22 DIAGNOSIS — K64.4: ICD-10-CM

## 2025-06-22 DIAGNOSIS — Z79.01: ICD-10-CM

## 2025-06-22 DIAGNOSIS — F17.200: ICD-10-CM

## 2025-06-22 DIAGNOSIS — I48.91: ICD-10-CM

## 2025-06-22 DIAGNOSIS — K59.00: Primary | ICD-10-CM

## 2025-06-22 LAB
ADD MANUAL DIFF: NO
ALANINE AMINOTRANSFERASE: 30 U/L (ref 14–59)
ALBUMIN GLOBULIN RATIO: 1
ALBUMIN LEVEL: 3.7 G/DL (ref 3.4–5)
ALKALINE PHOSPHATASE: 95 U/L (ref 46–116)
ANION GAP: 12.6
ASPARTATE AMINO TRANSFERASE: 22 U/L (ref 15–37)
BASOPHILS # BLD AUTO: 0 10^3/UL (ref 0–0.1)
BASOPHILS NFR BLD AUTO: 0.2 % (ref 0.2–2)
BILIRUBIN TOTAL: 0.6 MG/DL (ref 0.2–1)
BUN SERPL-MCNC: 23 MG/DL (ref 7–18)
BUN/CREAT SERPL: 27.4
CALCIUM: 9.7 MG/DL (ref 8.5–10.1)
CHLORIDE: 104 MMOL/L (ref 98–107)
CO2 BLD-SCNC: 31 MMOL/L (ref 21–32)
CREAT SERPL-SCNC: 0.84 MG/DL (ref 0.55–1.02)
EOSINOPHIL # BLD AUTO: 0 10^3/UL (ref 0–0.7)
EOSINOPHIL NFR BLD AUTO: 0.1 % (ref 0.9–7)
ERYTHROCYTE [DISTWIDTH] IN BLOOD BY AUTOMATED COUNT: 13.1 % (ref 11–15)
ESTIMATED GFR (AFRICAN AMERICA: >60
ESTIMATED GFR (NON-AFRICAN AME: >60
GLOBULIN: 3.6 G/DL
GLUCOSE SERPLBLD-MCNC: 136 MG/DL (ref 74–106)
HCT VFR BLD CALC: 43.1 % (ref 36–48)
HEMOGLOBIN: 14.6 G/DL (ref 12–16)
IMMATURE GRANULOCYTES ABS AUTO: 0.02 10^3/UL (ref 0–0.03)
IMMATURE GRANULOCYTES PCT AUTO: 0.2 % (ref 0–0.5)
LYMPHOCYTES  ABSOLUTE AUTO: 1.2 10^3/UL (ref 1.2–3.8)
LYMPHOCYTES PERCENT AUTO: 9.4 % (ref 20.5–60)
MCH RBC QN AUTO: 30.4 PG (ref 26.7–34)
MCV RBC: 89.8 FL (ref 81–99)
MEAN CORPUSCULAR HGB CONC: 33.9 G/DL (ref 29.9–35.2)
MEAN PLATELET VOLUME: 10.5 FL (ref 9.5–13.5)
MONOCYTES # BLD AUTO: 0.7 10^3/UL (ref 0.3–0.8)
MONOCYTES NFR BLD AUTO: 5.3 % (ref 1.7–12)
NEUTROPHILS # BLD AUTO: 10.4 10^3/UL (ref 1.4–6.5)
NEUTROPHILS NFR BLD AUTO: 84.8 % (ref 43–75)
PLATELET # BLD: 212 10^3/UL (ref 150–450)
POTASSIUM SERPLBLD-SCNC: 3.6 MMOL/L (ref 3.5–5.1)
RBC # BLD AUTO: 4.8 10^6/UL (ref 4.2–5.4)
SODIUM BLD-SCNC: 144 MMOL/L (ref 136–145)
TOTAL PROTEIN: 7.3 G/DL (ref 6.4–8.2)
WBC # BLD: 12.3 10^3/UL (ref 4–11)

## 2025-06-22 PROCEDURE — 74018 RADEX ABDOMEN 1 VIEW: CPT

## 2025-06-22 PROCEDURE — 85025 COMPLETE CBC W/AUTO DIFF WBC: CPT

## 2025-06-22 PROCEDURE — 36415 COLL VENOUS BLD VENIPUNCTURE: CPT

## 2025-06-22 PROCEDURE — 99284 EMERGENCY DEPT VISIT MOD MDM: CPT

## 2025-06-22 PROCEDURE — 80053 COMPREHEN METABOLIC PANEL: CPT

## 2025-06-23 ENCOUNTER — APPOINTMENT (OUTPATIENT)
Dept: CARDIOLOGY | Facility: CLINIC | Age: 70
End: 2025-06-23
Payer: OTHER GOVERNMENT

## 2025-06-23 ENCOUNTER — HOSPITAL ENCOUNTER (OUTPATIENT)
Dept: HOSPITAL 101 - ER | Age: 70
Setting detail: OBSERVATION
LOS: 2 days | Discharge: ANOTHER HOSPITAL | End: 2025-06-25
Payer: MEDICARE

## 2025-06-23 VITALS — OXYGEN SATURATION: 92 % | HEART RATE: 81 BPM

## 2025-06-23 VITALS — BODY MASS INDEX: 28.3 KG/M2 | BODY MASS INDEX: 28 KG/M2

## 2025-06-23 VITALS — HEART RATE: 86 BPM | OXYGEN SATURATION: 95 %

## 2025-06-23 VITALS — OXYGEN SATURATION: 93 % | HEART RATE: 79 BPM

## 2025-06-23 VITALS — HEART RATE: 66 BPM

## 2025-06-23 VITALS — HEART RATE: 69 BPM

## 2025-06-23 VITALS — HEART RATE: 73 BPM | OXYGEN SATURATION: 94 %

## 2025-06-23 VITALS — HEART RATE: 76 BPM | OXYGEN SATURATION: 92 %

## 2025-06-23 VITALS — OXYGEN SATURATION: 92 % | HEART RATE: 80 BPM

## 2025-06-23 VITALS — HEART RATE: 80 BPM

## 2025-06-23 VITALS — OXYGEN SATURATION: 95 % | HEART RATE: 79 BPM

## 2025-06-23 VITALS — HEART RATE: 82 BPM | OXYGEN SATURATION: 95 %

## 2025-06-23 VITALS — DIASTOLIC BLOOD PRESSURE: 98 MMHG | OXYGEN SATURATION: 95 % | SYSTOLIC BLOOD PRESSURE: 140 MMHG | HEART RATE: 83 BPM

## 2025-06-23 VITALS — TEMPERATURE: 97.8 F | DIASTOLIC BLOOD PRESSURE: 89 MMHG | OXYGEN SATURATION: 93 % | SYSTOLIC BLOOD PRESSURE: 150 MMHG

## 2025-06-23 VITALS
OXYGEN SATURATION: 94 % | HEART RATE: 61 BPM | SYSTOLIC BLOOD PRESSURE: 151 MMHG | TEMPERATURE: 97.88 F | DIASTOLIC BLOOD PRESSURE: 90 MMHG

## 2025-06-23 VITALS — HEART RATE: 84 BPM

## 2025-06-23 VITALS — HEART RATE: 88 BPM

## 2025-06-23 VITALS — HEART RATE: 179 BPM

## 2025-06-23 VITALS — HEART RATE: 61 BPM

## 2025-06-23 VITALS — HEART RATE: 68 BPM

## 2025-06-23 VITALS — OXYGEN SATURATION: 95 % | HEART RATE: 81 BPM

## 2025-06-23 VITALS
OXYGEN SATURATION: 92 % | SYSTOLIC BLOOD PRESSURE: 110 MMHG | HEART RATE: 70 BPM | TEMPERATURE: 98.6 F | DIASTOLIC BLOOD PRESSURE: 64 MMHG

## 2025-06-23 VITALS
HEART RATE: 82 BPM | OXYGEN SATURATION: 95 % | DIASTOLIC BLOOD PRESSURE: 98 MMHG | SYSTOLIC BLOOD PRESSURE: 140 MMHG | TEMPERATURE: 98.06 F

## 2025-06-23 VITALS — OXYGEN SATURATION: 93 % | HEART RATE: 83 BPM

## 2025-06-23 VITALS — HEART RATE: 82 BPM

## 2025-06-23 VITALS — HEART RATE: 89 BPM

## 2025-06-23 VITALS — HEART RATE: 93 BPM

## 2025-06-23 DIAGNOSIS — Z79.899: ICD-10-CM

## 2025-06-23 DIAGNOSIS — I10: ICD-10-CM

## 2025-06-23 DIAGNOSIS — K59.00: ICD-10-CM

## 2025-06-23 DIAGNOSIS — R07.89: ICD-10-CM

## 2025-06-23 DIAGNOSIS — F32.A: ICD-10-CM

## 2025-06-23 DIAGNOSIS — K64.4: ICD-10-CM

## 2025-06-23 DIAGNOSIS — I49.9: ICD-10-CM

## 2025-06-23 DIAGNOSIS — F41.9: ICD-10-CM

## 2025-06-23 DIAGNOSIS — F17.200: ICD-10-CM

## 2025-06-23 DIAGNOSIS — I48.91: Primary | ICD-10-CM

## 2025-06-23 DIAGNOSIS — Z79.01: ICD-10-CM

## 2025-06-23 DIAGNOSIS — Z79.82: ICD-10-CM

## 2025-06-23 DIAGNOSIS — Z86.73: ICD-10-CM

## 2025-06-23 LAB
ADD MANUAL DIFF: NO
ALANINE AMINOTRANSFERASE: 26 U/L (ref 14–59)
ALBUMIN GLOBULIN RATIO: 1
ALBUMIN LEVEL: 3.6 G/DL (ref 3.4–5)
ALKALINE PHOSPHATASE: 95 U/L (ref 46–116)
ANION GAP: 13.3
ASPARTATE AMINO TRANSFERASE: 22 U/L (ref 15–37)
BASOPHILS # BLD AUTO: 0 10^3/UL (ref 0–0.1)
BASOPHILS NFR BLD AUTO: 0.1 % (ref 0.2–2)
BILIRUBIN TOTAL: 0.6 MG/DL (ref 0.2–1)
BUN SERPL-MCNC: 15 MG/DL (ref 7–18)
BUN/CREAT SERPL: 25.4
CALCIUM: 9.4 MG/DL (ref 8.5–10.1)
CHLORIDE: 105 MMOL/L (ref 98–107)
CO2 BLD-SCNC: 29.4 MMOL/L (ref 21–32)
CREAT SERPL-SCNC: 0.59 MG/DL (ref 0.55–1.02)
EOSINOPHIL # BLD AUTO: 0 10^3/UL (ref 0–0.7)
EOSINOPHIL NFR BLD AUTO: 0.3 % (ref 0.9–7)
ERYTHROCYTE [DISTWIDTH] IN BLOOD BY AUTOMATED COUNT: 13.2 % (ref 11–15)
ERYTHROCYTE [DISTWIDTH] IN BLOOD BY AUTOMATED COUNT: 13.2 % (ref 11–15)
ESTIMATED GFR (AFRICAN AMERICA: >60
ESTIMATED GFR (NON-AFRICAN AME: >60
GLOBULIN: 3.7 G/DL
GLUCOSE SERPLBLD-MCNC: 109 MG/DL (ref 74–106)
HCT VFR BLD CALC: 41 % (ref 36–48)
HCT VFR BLD CALC: 42.1 % (ref 36–48)
HEMOGLOBIN: 13.9 G/DL (ref 12–16)
HEMOGLOBIN: 14 G/DL (ref 12–16)
IMMATURE GRANULOCYTES ABS AUTO: 0.05 10^3/UL (ref 0–0.03)
IMMATURE GRANULOCYTES PCT AUTO: 0.4 % (ref 0–0.5)
INR: 1.07
LYMPHOCYTES  ABSOLUTE AUTO: 2.4 10^3/UL (ref 1.2–3.8)
LYMPHOCYTES PERCENT AUTO: 20.9 % (ref 20.5–60)
MCH RBC QN AUTO: 29.7 PG (ref 26.7–34)
MCH RBC QN AUTO: 30.3 PG (ref 26.7–34)
MCV RBC: 89.2 FL (ref 81–99)
MCV RBC: 89.5 FL (ref 81–99)
MEAN CORPUSCULAR HGB CONC: 33.3 G/DL (ref 29.9–35.2)
MEAN CORPUSCULAR HGB CONC: 33.9 G/DL (ref 29.9–35.2)
MEAN PLATELET VOLUME: 10.5 FL (ref 9.5–13.5)
MEAN PLATELET VOLUME: 10.8 FL (ref 9.5–13.5)
MONOCYTES # BLD AUTO: 0.8 10^3/UL (ref 0.3–0.8)
MONOCYTES NFR BLD AUTO: 7.3 % (ref 1.7–12)
NEUTROPHILS # BLD AUTO: 8.1 10^3/UL (ref 1.4–6.5)
NEUTROPHILS NFR BLD AUTO: 71 % (ref 43–75)
NT PRO B TYPE NATRIURETIC PEPT: 619 PG/ML (ref ?–900)
PLATELET # BLD: 208 10^3/UL (ref 150–450)
PLATELET # BLD: 209 10^3/UL (ref 150–450)
POTASSIUM SERPLBLD-SCNC: 3.7 MMOL/L (ref 3.5–5.1)
PROTHROMBIN TIME: 11.3 SEC (ref 9–11.6)
RBC # BLD AUTO: 4.58 10^6/UL (ref 4.2–5.4)
RBC # BLD AUTO: 4.72 10^6/UL (ref 4.2–5.4)
SODIUM BLD-SCNC: 144 MMOL/L (ref 136–145)
TOTAL PROTEIN: 7.3 G/DL (ref 6.4–8.2)
TROPONIN I HIGH SENSITIVITY: 16 PG/ML (ref 4–51.3)
TROPONIN I HIGH SENSITIVITY: 16.5 PG/ML (ref 4–51.3)
WBC # BLD: 11.4 10^3/UL (ref 4–11)
WBC # BLD: 11.8 10^3/UL (ref 4–11)

## 2025-06-23 PROCEDURE — 83880 ASSAY OF NATRIURETIC PEPTIDE: CPT

## 2025-06-23 PROCEDURE — 36415 COLL VENOUS BLD VENIPUNCTURE: CPT

## 2025-06-23 PROCEDURE — 71045 X-RAY EXAM CHEST 1 VIEW: CPT

## 2025-06-23 PROCEDURE — 85610 PROTHROMBIN TIME: CPT

## 2025-06-23 PROCEDURE — 99285 EMERGENCY DEPT VISIT HI MDM: CPT

## 2025-06-23 PROCEDURE — 85027 COMPLETE CBC AUTOMATED: CPT

## 2025-06-23 PROCEDURE — G0378 HOSPITAL OBSERVATION PER HR: HCPCS

## 2025-06-23 PROCEDURE — 93005 ELECTROCARDIOGRAM TRACING: CPT

## 2025-06-23 PROCEDURE — A9500 TC99M SESTAMIBI: HCPCS

## 2025-06-23 PROCEDURE — 84484 ASSAY OF TROPONIN QUANT: CPT

## 2025-06-23 PROCEDURE — 78452 HT MUSCLE IMAGE SPECT MULT: CPT

## 2025-06-23 PROCEDURE — 96374 THER/PROPH/DIAG INJ IV PUSH: CPT

## 2025-06-23 PROCEDURE — 85025 COMPLETE CBC W/AUTO DIFF WBC: CPT

## 2025-06-23 PROCEDURE — 83735 ASSAY OF MAGNESIUM: CPT

## 2025-06-23 PROCEDURE — 93017 CV STRESS TEST TRACING ONLY: CPT

## 2025-06-23 PROCEDURE — 80053 COMPREHEN METABOLIC PANEL: CPT

## 2025-06-23 RX ADMIN — SODIUM CHLORIDE 1000 ML: 900 INJECTION, SOLUTION INTRAVENOUS at 11:16

## 2025-06-23 RX ADMIN — BUSPIRONE HYDROCHLORIDE 5 MG: 10 TABLET ORAL at 21:12

## 2025-06-23 RX ADMIN — METOPROLOL TARTRATE 5 MG: 1 INJECTION, SOLUTION INTRAVENOUS at 13:18

## 2025-06-23 RX ADMIN — ASPIRIN 81 MG 162 MG: 81 TABLET ORAL at 11:15

## 2025-06-23 RX ADMIN — METOPROLOL SUCCINATE 25 MG: 25 TABLET, EXTENDED RELEASE ORAL at 17:20

## 2025-06-23 RX ADMIN — APIXABAN 5 MG: 5 TABLET, FILM COATED ORAL at 13:44

## 2025-06-23 RX ADMIN — SERTRALINE HYDROCHLORIDE 100 MG: 100 TABLET ORAL at 21:12

## 2025-06-24 VITALS
DIASTOLIC BLOOD PRESSURE: 70 MMHG | OXYGEN SATURATION: 90 % | TEMPERATURE: 98.3 F | HEART RATE: 67 BPM | SYSTOLIC BLOOD PRESSURE: 109 MMHG

## 2025-06-24 VITALS
DIASTOLIC BLOOD PRESSURE: 75 MMHG | SYSTOLIC BLOOD PRESSURE: 145 MMHG | HEART RATE: 86 BPM | OXYGEN SATURATION: 94 % | TEMPERATURE: 97.7 F

## 2025-06-24 VITALS
SYSTOLIC BLOOD PRESSURE: 130 MMHG | OXYGEN SATURATION: 93 % | TEMPERATURE: 98.42 F | HEART RATE: 64 BPM | DIASTOLIC BLOOD PRESSURE: 71 MMHG

## 2025-06-24 VITALS
OXYGEN SATURATION: 91 % | DIASTOLIC BLOOD PRESSURE: 82 MMHG | HEART RATE: 57 BPM | SYSTOLIC BLOOD PRESSURE: 136 MMHG | TEMPERATURE: 97.8 F

## 2025-06-24 VITALS
OXYGEN SATURATION: 93 % | SYSTOLIC BLOOD PRESSURE: 104 MMHG | HEART RATE: 60 BPM | TEMPERATURE: 97.88 F | DIASTOLIC BLOOD PRESSURE: 62 MMHG

## 2025-06-24 VITALS — HEART RATE: 58 BPM

## 2025-06-24 VITALS — HEART RATE: 73 BPM

## 2025-06-24 VITALS — HEART RATE: 82 BPM

## 2025-06-24 VITALS
HEART RATE: 70 BPM | OXYGEN SATURATION: 94 % | SYSTOLIC BLOOD PRESSURE: 148 MMHG | DIASTOLIC BLOOD PRESSURE: 85 MMHG | TEMPERATURE: 98.06 F

## 2025-06-24 VITALS — HEART RATE: 65 BPM

## 2025-06-24 VITALS — HEART RATE: 60 BPM

## 2025-06-24 VITALS — HEART RATE: 66 BPM

## 2025-06-24 VITALS — HEART RATE: 56 BPM

## 2025-06-24 VITALS — HEART RATE: 77 BPM

## 2025-06-24 VITALS — HEART RATE: 157 BPM

## 2025-06-24 VITALS — HEART RATE: 62 BPM

## 2025-06-24 LAB
ADD MANUAL DIFF: NO
ALANINE AMINOTRANSFERASE: 24 U/L (ref 14–59)
ALBUMIN GLOBULIN RATIO: 0.9
ALBUMIN LEVEL: 3.1 G/DL (ref 3.4–5)
ALKALINE PHOSPHATASE: 91 U/L (ref 46–116)
ANION GAP: 9.5
ASPARTATE AMINO TRANSFERASE: 15 U/L (ref 15–37)
BASOPHILS # BLD AUTO: 0 10^3/UL (ref 0–0.1)
BASOPHILS NFR BLD AUTO: 0.3 % (ref 0.2–2)
BILIRUBIN TOTAL: 0.5 MG/DL (ref 0.2–1)
BUN SERPL-MCNC: 14 MG/DL (ref 7–18)
BUN/CREAT SERPL: 27.5
CALCIUM: 9.4 MG/DL (ref 8.5–10.1)
CHLORIDE: 106 MMOL/L (ref 98–107)
CO2 BLD-SCNC: 32.2 MMOL/L (ref 21–32)
CREAT SERPL-SCNC: 0.51 MG/DL (ref 0.55–1.02)
EOSINOPHIL # BLD AUTO: 0.1 10^3/UL (ref 0–0.7)
EOSINOPHIL NFR BLD AUTO: 0.4 % (ref 0.9–7)
ERYTHROCYTE [DISTWIDTH] IN BLOOD BY AUTOMATED COUNT: 13.2 % (ref 11–15)
ESTIMATED GFR (AFRICAN AMERICA: >60
ESTIMATED GFR (NON-AFRICAN AME: >60
GLOBULIN: 3.5 G/DL
GLUCOSE SERPLBLD-MCNC: 110 MG/DL (ref 74–106)
HCT VFR BLD CALC: 37.6 % (ref 36–48)
HEMOGLOBIN: 12.8 G/DL (ref 12–16)
IMMATURE GRANULOCYTES ABS AUTO: 0.04 10^3/UL (ref 0–0.03)
IMMATURE GRANULOCYTES PCT AUTO: 0.3 % (ref 0–0.5)
LYMPHOCYTES  ABSOLUTE AUTO: 2.3 10^3/UL (ref 1.2–3.8)
LYMPHOCYTES PERCENT AUTO: 19.8 % (ref 20.5–60)
MAGNESIUM: 2.1 MG/DL (ref 1.8–2.4)
MCH RBC QN AUTO: 30.5 PG (ref 26.7–34)
MCV RBC: 89.7 FL (ref 81–99)
MEAN CORPUSCULAR HGB CONC: 34 G/DL (ref 29.9–35.2)
MEAN PLATELET VOLUME: 10.8 FL (ref 9.5–13.5)
MONOCYTES # BLD AUTO: 1.1 10^3/UL (ref 0.3–0.8)
MONOCYTES NFR BLD AUTO: 9.2 % (ref 1.7–12)
NEUTROPHILS # BLD AUTO: 8 10^3/UL (ref 1.4–6.5)
NEUTROPHILS NFR BLD AUTO: 70 % (ref 43–75)
PLATELET # BLD: 180 10^3/UL (ref 150–450)
POTASSIUM SERPLBLD-SCNC: 3.7 MMOL/L (ref 3.5–5.1)
RBC # BLD AUTO: 4.19 10^6/UL (ref 4.2–5.4)
SODIUM BLD-SCNC: 144 MMOL/L (ref 136–145)
TOTAL PROTEIN: 6.6 G/DL (ref 6.4–8.2)
WBC # BLD: 11.5 10^3/UL (ref 4–11)

## 2025-06-24 RX ADMIN — DOCUSATE SODIUM 100 MG: 100 CAPSULE, LIQUID FILLED ORAL at 10:32

## 2025-06-24 RX ADMIN — APIXABAN 5 MG: 5 TABLET, FILM COATED ORAL at 17:59

## 2025-06-24 RX ADMIN — SERTRALINE HYDROCHLORIDE 100 MG: 100 TABLET ORAL at 21:10

## 2025-06-24 RX ADMIN — MONTELUKAST 10 MG: 10 TABLET, FILM COATED ORAL at 10:33

## 2025-06-24 RX ADMIN — POLYETHYLENE GLYCOL 3350 17 GM: 17 POWDER, FOR SOLUTION ORAL at 10:32

## 2025-06-24 RX ADMIN — LOSARTAN POTASSIUM 100 MG: 50 TABLET, FILM COATED ORAL at 10:33

## 2025-06-24 RX ADMIN — BUSPIRONE HYDROCHLORIDE 5 MG: 10 TABLET ORAL at 10:32

## 2025-06-24 RX ADMIN — ASPIRIN 81 MG: 81 TABLET ORAL at 10:32

## 2025-06-24 RX ADMIN — DOCUSATE SODIUM 100 MG: 100 CAPSULE, LIQUID FILLED ORAL at 21:10

## 2025-06-24 RX ADMIN — CETIRIZINE HYDROCHLORIDE 10 MG: 10 TABLET, FILM COATED ORAL at 10:33

## 2025-06-24 RX ADMIN — SERTRALINE HYDROCHLORIDE 50 MG: 50 TABLET ORAL at 10:33

## 2025-06-24 RX ADMIN — APIXABAN 5 MG: 5 TABLET, FILM COATED ORAL at 05:44

## 2025-06-24 RX ADMIN — BUSPIRONE HYDROCHLORIDE 5 MG: 10 TABLET ORAL at 21:10

## 2025-06-24 RX ADMIN — REGADENOSON 0.4 MG: 0.08 INJECTION, SOLUTION INTRAVENOUS at 08:36

## 2025-06-24 RX ADMIN — BUTALBITAL, ACETAMINOPHEN, AND CAFFEINE 1 TAB: 50; 325; 40 TABLET ORAL at 12:12

## 2025-06-24 RX ADMIN — METOPROLOL SUCCINATE 25 MG: 25 TABLET, EXTENDED RELEASE ORAL at 10:33

## (undated) DEVICE — STRIP,CLOSURE,WOUND,MEDI-STRIP,1/2X4: Brand: MEDLINE

## (undated) DEVICE — ADHESIVE LIQ 2OZ ADJUNCT FOR DSG MASTISOL

## (undated) DEVICE — ADAPTER URO SCP UROLOK LL

## (undated) DEVICE — Device

## (undated) DEVICE — DUAL LUMEN URETERAL CATHETER

## (undated) DEVICE — URETEROSCOPE FLX DIGITAL 3.1X650 MM 1.2 MM AXIS DISP

## (undated) DEVICE — SOLUTION IRRIG 3000ML 0.9% SOD CHL USP UROMATIC PLAS CONT

## (undated) DEVICE — GUIDEWIRE VASC STR 3 CM 0.035 INX150 CM STD NIT ZIPWIRE

## (undated) DEVICE — FIBER LASER EXCALIBUR HOLM

## (undated) DEVICE — SINGLE ACTION PUMPING SYSTEM

## (undated) DEVICE — SOLUTION IV IRRIG WATER 1000ML POUR BRL 2F7114